# Patient Record
Sex: FEMALE | Race: WHITE | NOT HISPANIC OR LATINO | URBAN - METROPOLITAN AREA
[De-identification: names, ages, dates, MRNs, and addresses within clinical notes are randomized per-mention and may not be internally consistent; named-entity substitution may affect disease eponyms.]

---

## 2020-03-01 ENCOUNTER — OUTPATIENT (OUTPATIENT)
Dept: OUTPATIENT SERVICES | Facility: HOSPITAL | Age: 60
LOS: 1 days | End: 2020-03-01
Payer: MEDICAID

## 2020-03-01 PROCEDURE — G9001: CPT

## 2020-03-05 ENCOUNTER — INPATIENT (INPATIENT)
Facility: HOSPITAL | Age: 60
LOS: 0 days | Discharge: HOME | End: 2020-03-06
Attending: INTERNAL MEDICINE | Admitting: INTERNAL MEDICINE
Payer: MEDICAID

## 2020-03-05 VITALS
RESPIRATION RATE: 18 BRPM | SYSTOLIC BLOOD PRESSURE: 108 MMHG | DIASTOLIC BLOOD PRESSURE: 54 MMHG | OXYGEN SATURATION: 98 % | HEART RATE: 74 BPM | TEMPERATURE: 98 F | HEIGHT: 60 IN | WEIGHT: 139.99 LBS

## 2020-03-05 DIAGNOSIS — Z94.4 LIVER TRANSPLANT STATUS: Chronic | ICD-10-CM

## 2020-03-05 DIAGNOSIS — Z98.890 OTHER SPECIFIED POSTPROCEDURAL STATES: Chronic | ICD-10-CM

## 2020-03-05 LAB
ALBUMIN SERPL ELPH-MCNC: 3.9 G/DL — SIGNIFICANT CHANGE UP (ref 3.5–5.2)
ALP SERPL-CCNC: 296 U/L — HIGH (ref 30–115)
ALT FLD-CCNC: 34 U/L — SIGNIFICANT CHANGE UP (ref 0–41)
ANION GAP SERPL CALC-SCNC: 10 MMOL/L — SIGNIFICANT CHANGE UP (ref 7–14)
APTT BLD: 32.7 SEC — SIGNIFICANT CHANGE UP (ref 27–39.2)
AST SERPL-CCNC: 56 U/L — HIGH (ref 0–41)
BASOPHILS # BLD AUTO: 0.03 K/UL — SIGNIFICANT CHANGE UP (ref 0–0.2)
BASOPHILS NFR BLD AUTO: 0.7 % — SIGNIFICANT CHANGE UP (ref 0–1)
BILIRUB SERPL-MCNC: 0.4 MG/DL — SIGNIFICANT CHANGE UP (ref 0.2–1.2)
BUN SERPL-MCNC: 20 MG/DL — SIGNIFICANT CHANGE UP (ref 10–20)
CALCIUM SERPL-MCNC: 9 MG/DL — SIGNIFICANT CHANGE UP (ref 8.5–10.1)
CHLORIDE SERPL-SCNC: 105 MMOL/L — SIGNIFICANT CHANGE UP (ref 98–110)
CO2 SERPL-SCNC: 24 MMOL/L — SIGNIFICANT CHANGE UP (ref 17–32)
CREAT SERPL-MCNC: 1.2 MG/DL — SIGNIFICANT CHANGE UP (ref 0.7–1.5)
EOSINOPHIL # BLD AUTO: 0.2 K/UL — SIGNIFICANT CHANGE UP (ref 0–0.7)
EOSINOPHIL NFR BLD AUTO: 4.3 % — SIGNIFICANT CHANGE UP (ref 0–8)
GLUCOSE SERPL-MCNC: 100 MG/DL — HIGH (ref 70–99)
HCT VFR BLD CALC: 35.4 % — LOW (ref 37–47)
HGB BLD-MCNC: 11.1 G/DL — LOW (ref 12–16)
IMM GRANULOCYTES NFR BLD AUTO: 0.4 % — HIGH (ref 0.1–0.3)
INR BLD: 1.1 RATIO — SIGNIFICANT CHANGE UP (ref 0.65–1.3)
LYMPHOCYTES # BLD AUTO: 0.77 K/UL — LOW (ref 1.2–3.4)
LYMPHOCYTES # BLD AUTO: 16.7 % — LOW (ref 20.5–51.1)
MAGNESIUM SERPL-MCNC: 1.3 MG/DL — LOW (ref 1.8–2.4)
MCHC RBC-ENTMCNC: 28.8 PG — SIGNIFICANT CHANGE UP (ref 27–31)
MCHC RBC-ENTMCNC: 31.4 G/DL — LOW (ref 32–37)
MCV RBC AUTO: 91.9 FL — SIGNIFICANT CHANGE UP (ref 81–99)
MONOCYTES # BLD AUTO: 0.53 K/UL — SIGNIFICANT CHANGE UP (ref 0.1–0.6)
MONOCYTES NFR BLD AUTO: 11.5 % — HIGH (ref 1.7–9.3)
NEUTROPHILS # BLD AUTO: 3.05 K/UL — SIGNIFICANT CHANGE UP (ref 1.4–6.5)
NEUTROPHILS NFR BLD AUTO: 66.4 % — SIGNIFICANT CHANGE UP (ref 42.2–75.2)
NRBC # BLD: 0 /100 WBCS — SIGNIFICANT CHANGE UP (ref 0–0)
PLATELET # BLD AUTO: 109 K/UL — LOW (ref 130–400)
POTASSIUM SERPL-MCNC: 4.3 MMOL/L — SIGNIFICANT CHANGE UP (ref 3.5–5)
POTASSIUM SERPL-SCNC: 4.3 MMOL/L — SIGNIFICANT CHANGE UP (ref 3.5–5)
PROT SERPL-MCNC: 8.1 G/DL — HIGH (ref 6–8)
PROTHROM AB SERPL-ACNC: 12.6 SEC — SIGNIFICANT CHANGE UP (ref 9.95–12.87)
RBC # BLD: 3.85 M/UL — LOW (ref 4.2–5.4)
RBC # FLD: 14.9 % — HIGH (ref 11.5–14.5)
SODIUM SERPL-SCNC: 139 MMOL/L — SIGNIFICANT CHANGE UP (ref 135–146)
TROPONIN T SERPL-MCNC: <0.01 NG/ML — SIGNIFICANT CHANGE UP
WBC # BLD: 4.6 K/UL — LOW (ref 4.8–10.8)
WBC # FLD AUTO: 4.6 K/UL — LOW (ref 4.8–10.8)

## 2020-03-05 PROCEDURE — 99285 EMERGENCY DEPT VISIT HI MDM: CPT

## 2020-03-05 PROCEDURE — 70450 CT HEAD/BRAIN W/O DYE: CPT | Mod: 26

## 2020-03-05 PROCEDURE — 93880 EXTRACRANIAL BILAT STUDY: CPT | Mod: 26

## 2020-03-05 PROCEDURE — 70551 MRI BRAIN STEM W/O DYE: CPT | Mod: 26

## 2020-03-05 PROCEDURE — 99223 1ST HOSP IP/OBS HIGH 75: CPT

## 2020-03-05 PROCEDURE — 70544 MR ANGIOGRAPHY HEAD W/O DYE: CPT | Mod: 26,59

## 2020-03-05 PROCEDURE — 70548 MR ANGIOGRAPHY NECK W/DYE: CPT | Mod: 26

## 2020-03-05 PROCEDURE — 71046 X-RAY EXAM CHEST 2 VIEWS: CPT | Mod: 26

## 2020-03-05 RX ORDER — ENOXAPARIN SODIUM 100 MG/ML
40 INJECTION SUBCUTANEOUS DAILY
Refills: 0 | Status: DISCONTINUED | OUTPATIENT
Start: 2020-03-05 | End: 2020-03-06

## 2020-03-05 RX ORDER — MAGNESIUM SULFATE 500 MG/ML
2 VIAL (ML) INJECTION ONCE
Refills: 0 | Status: COMPLETED | OUTPATIENT
Start: 2020-03-05 | End: 2020-03-05

## 2020-03-05 RX ORDER — ASPIRIN/CALCIUM CARB/MAGNESIUM 324 MG
325 TABLET ORAL ONCE
Refills: 0 | Status: COMPLETED | OUTPATIENT
Start: 2020-03-05 | End: 2020-03-05

## 2020-03-05 RX ORDER — ATORVASTATIN CALCIUM 80 MG/1
80 TABLET, FILM COATED ORAL AT BEDTIME
Refills: 0 | Status: DISCONTINUED | OUTPATIENT
Start: 2020-03-05 | End: 2020-03-06

## 2020-03-05 RX ORDER — PANTOPRAZOLE SODIUM 20 MG/1
40 TABLET, DELAYED RELEASE ORAL
Refills: 0 | Status: DISCONTINUED | OUTPATIENT
Start: 2020-03-05 | End: 2020-03-06

## 2020-03-05 RX ORDER — TACROLIMUS 5 MG/1
2 CAPSULE ORAL
Refills: 0 | Status: DISCONTINUED | OUTPATIENT
Start: 2020-03-05 | End: 2020-03-06

## 2020-03-05 RX ORDER — SENNA PLUS 8.6 MG/1
2 TABLET ORAL AT BEDTIME
Refills: 0 | Status: DISCONTINUED | OUTPATIENT
Start: 2020-03-05 | End: 2020-03-06

## 2020-03-05 RX ORDER — CHLORHEXIDINE GLUCONATE 213 G/1000ML
1 SOLUTION TOPICAL
Refills: 0 | Status: DISCONTINUED | OUTPATIENT
Start: 2020-03-05 | End: 2020-03-06

## 2020-03-05 RX ADMIN — Medication 50 GRAM(S): at 21:06

## 2020-03-05 RX ADMIN — Medication 1 APPLICATION(S): at 22:24

## 2020-03-05 RX ADMIN — Medication 325 MILLIGRAM(S): at 12:14

## 2020-03-05 RX ADMIN — Medication 50 GRAM(S): at 11:13

## 2020-03-05 RX ADMIN — SENNA PLUS 2 TABLET(S): 8.6 TABLET ORAL at 21:05

## 2020-03-05 RX ADMIN — Medication 2 GRAM(S): at 12:15

## 2020-03-05 RX ADMIN — ATORVASTATIN CALCIUM 80 MILLIGRAM(S): 80 TABLET, FILM COATED ORAL at 21:07

## 2020-03-05 RX ADMIN — TACROLIMUS 2 MILLIGRAM(S): 5 CAPSULE ORAL at 21:07

## 2020-03-05 NOTE — H&P ADULT - NSHPPHYSICALEXAM_GEN_ALL_CORE
PHYSICAL EXAM:  GENERAL: NAD, well-developed  HEAD:  Atraumatic, Normocephalic  EYES: EOMI, PERRLA, conjunctiva and sclera clear  NECK: Supple, No JVD  CHEST/LUNG: Clear to auscultation bilaterally; No wheeze  HEART: Regular rate and rhythm; No murmurs, rubs, or gallops  ABDOMEN: Soft, Nontender, Nondistended; Bowel sounds present  EXTREMITIES:  2+ Peripheral Pulses, No clubbing, cyanosis, or edema  PSYCH: AAOx3  NEUROLOGY: non-focal, mild slurred speech noted, no weakness or sensory deficits of all extremities. No facial droop noted.  SKIN: No rashes or lesions

## 2020-03-05 NOTE — ED ADULT NURSE NOTE - PMH
Acid reflux    CVA (cerebral vascular accident)    Hemiparesis due to old stroke    Liver transplanted

## 2020-03-05 NOTE — ED PROVIDER NOTE - ATTENDING CONTRIBUTION TO CARE
I personally evaluated patient. I agree with the findings and plan with all documentation on chart except as documented  in my note.    60 y/o F PMH CVA 2018 w no residual deficits, Liver transplant (2006) who woke up this morning 7:30AM with slurred speech that lasted approximately one hour. Symptoms resolved prior to EMS arrival, but patient endorses some return of symptoms. Last known well time was last night. Patient is outside of the t-PA window,and is not an interventional candidate given NIH of 0. No HA, no difficulty walking. Endorses having a liver Bx done yesterday. no fever, no abdominal pain. Denies weakness. NIH score upon arrival is 0.    On exam, VS reviewed. Stroke Code called upon arrival. Patient had immediate head CT and appropriate labs. ED work up reviewed and head CT unremarkable.  Will admit for TIA work up for MRI and neck vessel imaging. Patient and family spoken to in detail about results and plan of care.  All questions addressed.  They are aware patient requires admission for further treatment and work up.

## 2020-03-05 NOTE — ED ADULT TRIAGE NOTE - CHIEF COMPLAINT QUOTE
Per EMS, patient woke up at 7:30 am with slurred speech and weakness on right side. Pt states by 8:45am symptoms were resolved. Pt had a stroke in 2018

## 2020-03-05 NOTE — ED PROVIDER NOTE - OBJECTIVE STATEMENT
Patient BIBA for possible TIA, H/o CVA 2018, Woke this am 7:30, noted speech was slurred last approx 1 hour. Sx resolved prior to EMS arrival and patient in now back to normal. No HA, no difficulty walking. H/o liver transplant. Had Bx done yesterday. no fever, no adnominal pain.

## 2020-03-05 NOTE — ED PROVIDER NOTE - CARE PLAN
Principal Discharge DX:	TIA (transient ischemic attack)  Secondary Diagnosis:	H/O liver transplant  Secondary Diagnosis:	History of liver biopsy

## 2020-03-05 NOTE — ED PROVIDER NOTE - PROGRESS NOTE DETAILS
Patient not stroke code, patient woke with Sx, Onset unknown. Sx resloved, NIH stroke scale 0 at this time case discussed with Dr Burk (Neuro). Wants patient admitted for q h neuro checks, Dr Escobar accepts in CCU tele

## 2020-03-05 NOTE — ED ADULT NURSE NOTE - NSIMPLEMENTINTERV_GEN_ALL_ED
Implemented All Fall Risk Interventions:  [place] to call system. Call bell, personal items and telephone within reach. Instruct patient to call for assistance. Room bathroom lighting operational. Non-slip footwear when patient is off stretcher. Physically safe environment: no spills, clutter or unnecessary equipment. Stretcher in lowest position, wheels locked, appropriate side rails in place. Provide visual cue, wrist band, yellow gown, etc. Monitor gait and stability. Monitor for mental status changes and reorient to person, place, and time. Review medications for side effects contributing to fall risk. Reinforce activity limits and safety measures with patient and family.

## 2020-03-05 NOTE — H&P ADULT - ASSESSMENT
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 Admitted to r/o CVA.    1. Slurred speech, possibly secondary to TIA/CVA, ?magnesium deficiency: Will obtain MRI/MRA head neck. Monitor in CCU for q1h neurochecks. Speech/Swallow therapist to see patient. Neurology eval to follow up. Cont ASA/Statin.   2. h/o Liver transplant due to PBC: Cont tacrolimus 2mg BID. Biopsy was done yesterday at [facility] due to transaminitis. 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 Admitted to r/o CVA.    1. Slurred speech, possibly secondary to TIA/CVA, ?magnesium deficiency: Will obtain MRI/MRA head neck, echo, carotid doppler. Monitor in CCU for q4h neurochecks. Speech/Swallow therapist to see patient. Neurology eval to follow up. Cont ASA/Statin.   2. h/o Liver transplant due to PBC: Cont tacrolimus 2mg BID. Biopsy was done yesterday at [facility] due to transaminitis. Outpatient f/u    GI/DVT PPX

## 2020-03-05 NOTE — ED PROVIDER NOTE - CLINICAL SUMMARY MEDICAL DECISION MAKING FREE TEXT BOX
60 y/o F PMH CVA 2018 w no residual deficits, Liver transplant (2006) who woke up this morning 7:30AM with slurred speech that lasted approximately one hour. Symptoms resolved prior to EMS arrival, but patient endorses some return of symptoms. Last known well time was last night. Patient is outside of the t-PA window,and is not an interventional candidate given NIH of 0. No HA, no difficulty walking. Endorses having a liver Bx done yesterday. no fever, no abdominal pain. Denies weakness. NIH score upon arrival is 0.    On exam, VS reviewed. Stroke Code called upon arrival. Patient had immediate head CT and appropriate labs. ED work up reviewed and head CT unremarkable.  Will admit for TIA work up for MRI and neck vessel imaging. Patient and family spoken to in detail about results and plan of care.  All questions addressed.  They are aware patient requires admission for further treatment and work up.

## 2020-03-05 NOTE — H&P ADULT - HISTORY OF PRESENT ILLNESS
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 Symptoms resolved prior to EMS arrival, but patient endorses some return of symptoms.. No HA, no difficulty walking. Endorses having a liver Bx done yesterday. no fever, no abdominal pain. Denies weakness

## 2020-03-06 ENCOUNTER — TRANSCRIPTION ENCOUNTER (OUTPATIENT)
Age: 60
End: 2020-03-06

## 2020-03-06 VITALS — DIASTOLIC BLOOD PRESSURE: 66 MMHG | SYSTOLIC BLOOD PRESSURE: 135 MMHG

## 2020-03-06 PROBLEM — Z00.00 ENCOUNTER FOR PREVENTIVE HEALTH EXAMINATION: Status: ACTIVE | Noted: 2020-03-06

## 2020-03-06 LAB
ANION GAP SERPL CALC-SCNC: 7 MMOL/L — SIGNIFICANT CHANGE UP (ref 7–14)
BUN SERPL-MCNC: 15 MG/DL — SIGNIFICANT CHANGE UP (ref 10–20)
CALCIUM SERPL-MCNC: 8.2 MG/DL — LOW (ref 8.5–10.1)
CHLORIDE SERPL-SCNC: 106 MMOL/L — SIGNIFICANT CHANGE UP (ref 98–110)
CHOLEST SERPL-MCNC: 138 MG/DL — SIGNIFICANT CHANGE UP (ref 100–200)
CO2 SERPL-SCNC: 26 MMOL/L — SIGNIFICANT CHANGE UP (ref 17–32)
CREAT SERPL-MCNC: 1.2 MG/DL — SIGNIFICANT CHANGE UP (ref 0.7–1.5)
GLUCOSE SERPL-MCNC: 104 MG/DL — HIGH (ref 70–99)
HCT VFR BLD CALC: 32 % — LOW (ref 37–47)
HCV AB S/CO SERPL IA: 0.47 S/CO — SIGNIFICANT CHANGE UP (ref 0–0.99)
HCV AB SERPL-IMP: SIGNIFICANT CHANGE UP
HDLC SERPL-MCNC: 73 MG/DL — SIGNIFICANT CHANGE UP
HGB BLD-MCNC: 10 G/DL — LOW (ref 12–16)
LIPID PNL WITH DIRECT LDL SERPL: 55 MG/DL — SIGNIFICANT CHANGE UP (ref 4–129)
MAGNESIUM SERPL-MCNC: 1.9 MG/DL — SIGNIFICANT CHANGE UP (ref 1.8–2.4)
MCHC RBC-ENTMCNC: 29 PG — SIGNIFICANT CHANGE UP (ref 27–31)
MCHC RBC-ENTMCNC: 31.3 G/DL — LOW (ref 32–37)
MCV RBC AUTO: 92.8 FL — SIGNIFICANT CHANGE UP (ref 81–99)
NRBC # BLD: 0 /100 WBCS — SIGNIFICANT CHANGE UP (ref 0–0)
PLATELET # BLD AUTO: 89 K/UL — LOW (ref 130–400)
POTASSIUM SERPL-MCNC: 4.7 MMOL/L — SIGNIFICANT CHANGE UP (ref 3.5–5)
POTASSIUM SERPL-SCNC: 4.7 MMOL/L — SIGNIFICANT CHANGE UP (ref 3.5–5)
RBC # BLD: 3.45 M/UL — LOW (ref 4.2–5.4)
RBC # FLD: 15 % — HIGH (ref 11.5–14.5)
SODIUM SERPL-SCNC: 139 MMOL/L — SIGNIFICANT CHANGE UP (ref 135–146)
TOTAL CHOLESTEROL/HDL RATIO MEASUREMENT: 1.9 RATIO — LOW (ref 4–5.5)
TRIGL SERPL-MCNC: 57 MG/DL — SIGNIFICANT CHANGE UP (ref 10–149)
WBC # BLD: 3.57 K/UL — LOW (ref 4.8–10.8)
WBC # FLD AUTO: 3.57 K/UL — LOW (ref 4.8–10.8)

## 2020-03-06 PROCEDURE — 99222 1ST HOSP IP/OBS MODERATE 55: CPT

## 2020-03-06 PROCEDURE — 99239 HOSP IP/OBS DSCHRG MGMT >30: CPT

## 2020-03-06 RX ORDER — ASPIRIN/CALCIUM CARB/MAGNESIUM 324 MG
1 TABLET ORAL
Qty: 30 | Refills: 0
Start: 2020-03-06

## 2020-03-06 RX ORDER — MAGNESIUM OXIDE 400 MG ORAL TABLET 241.3 MG
1 TABLET ORAL
Qty: 30 | Refills: 0
Start: 2020-03-06

## 2020-03-06 RX ORDER — MAGNESIUM OXIDE 400 MG ORAL TABLET 241.3 MG
400 TABLET ORAL DAILY
Refills: 0 | Status: DISCONTINUED | OUTPATIENT
Start: 2020-03-06 | End: 2020-03-06

## 2020-03-06 RX ORDER — ASPIRIN/CALCIUM CARB/MAGNESIUM 324 MG
81 TABLET ORAL DAILY
Refills: 0 | Status: DISCONTINUED | OUTPATIENT
Start: 2020-03-06 | End: 2020-03-06

## 2020-03-06 RX ADMIN — ENOXAPARIN SODIUM 40 MILLIGRAM(S): 100 INJECTION SUBCUTANEOUS at 11:00

## 2020-03-06 RX ADMIN — TACROLIMUS 2 MILLIGRAM(S): 5 CAPSULE ORAL at 08:56

## 2020-03-06 RX ADMIN — Medication 81 MILLIGRAM(S): at 11:01

## 2020-03-06 RX ADMIN — PANTOPRAZOLE SODIUM 40 MILLIGRAM(S): 20 TABLET, DELAYED RELEASE ORAL at 05:42

## 2020-03-06 RX ADMIN — CHLORHEXIDINE GLUCONATE 1 APPLICATION(S): 213 SOLUTION TOPICAL at 05:42

## 2020-03-06 RX ADMIN — MAGNESIUM OXIDE 400 MG ORAL TABLET 400 MILLIGRAM(S): 241.3 TABLET ORAL at 11:11

## 2020-03-06 NOTE — DISCHARGE NOTE PROVIDER - HOSPITAL COURSE
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 Symptoms resolved prior to EMS arrival, but patient endorses some return of symptoms.. No HA, no difficulty walking. Endorses having a liver Bx done yesterday. no fever, no abdominal pain. Denies weakness     She had imaging for CVA including MRI and MRA. The imaging was normal. Patient was seen by neurology who recommended aspirin but no statin given her history of transplant and rising LFTs.        ECHO preliminary report shows mild to moderate AI.

## 2020-03-06 NOTE — DISCHARGE NOTE PROVIDER - NSDCMRMEDTOKEN_GEN_ALL_CORE_FT
aspirin 81 mg oral tablet, chewable: 1 tab(s) orally once a day  magnesium oxide 400 mg (241.3 mg elemental magnesium) oral tablet: 1 tab(s) orally once a day  pantoprazole:   tacrolimus:

## 2020-03-06 NOTE — CONSULT NOTE ADULT - SUBJECTIVE AND OBJECTIVE BOX
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 Symptoms resolved prior to EMS arrival, but patient endorses some return of symptoms.. No HA, no difficulty walking. Endorses having a liver Bx done yesterday. no fever, no abdominal pain. Denies weakness (05 Mar 2020 12:50)      NEURO  PATIENT IS A 58 YO FEMALE WITH HX OF TIA/CVA, LIVER TRANSPLANT 2/2 PBC AND RECENT BX ADM WITH 1 HR OF DYSARTHRIA. PATIENT SAID THAT THIS OCCURRED IN THE PAST 2/2 HYPOMAGNESEMIA. HER Mg level 1.9   she is back at her baseline  she had unremarkable expedited  mri brain and mra h/n  no other focal neuro complaints       Vital Signs Last 24 Hrs  T(C): 37.1 (06 Mar 2020 07:02), Max: 37.1 (06 Mar 2020 07:02)  T(F): 98.7 (06 Mar 2020 07:02), Max: 98.7 (06 Mar 2020 07:02)  HR: 70 (06 Mar 2020 07:02) (70 - 83)  BP: 121/66 (06 Mar 2020 07:02) (119/64 - 152/67)  BP(mean): 88 (06 Mar 2020 07:02) (86 - 88)  RR: 18 (06 Mar 2020 07:02) (16 - 18)  SpO2: 98% (05 Mar 2020 11:23) (98% - 98%)    Neurological Exam:   Mental status: Awake, alert and oriented x3.  Recent and remote memory intact.  Naming, repetition and comprehension intact.  Attention/concentration intact.  No dysarthria, no aphasia.  Fund of knowledge appropriate.    Cranial nerves: Pupils equally round and reactive to light, visual fields full, no nystagmus, extraocular muscles intact, V1 through V3 intact bilaterally and symmetric, face symmetric, hearing intact to finger rub, palate elevation symmetric, tongue was midline.  Motor:  MRC grading 5/5 b/l UE/LE.   strength 5/5.  Normal tone and bulk.  No abnormal movements.    Sensation: Intact to light touch, proprioception, and pinprick.   Coordination: No dysmetria on finger-to-nose and heel-to-shin.  No dysdiadokinesia.  Reflexes: 2+ in bilateral UE/LE, downgoing toes bilaterally. (-) Dhiraj.      Medications  aspirin  chewable 81 milliGRAM(s) Oral daily  atorvastatin 80 milliGRAM(s) Oral at bedtime  bisacodyl 5 milliGRAM(s) Oral daily PRN  chlorhexidine 4% Liquid 1 Application(s) Topical <User Schedule>  enoxaparin Injectable 40 milliGRAM(s) SubCutaneous daily  magnesium oxide 400 milliGRAM(s) Oral daily  pantoprazole    Tablet 40 milliGRAM(s) Oral before breakfast  senna 2 Tablet(s) Oral at bedtime  tacrolimus 2 milliGRAM(s) Oral two times a day  triamcinolone 0.1% Oral Paste 1 Application(s) Topical two times a day PRN      Lab  03-06    139  |  106  |  15  ----------------------------<  104<H>  4.7   |  26  |  1.2    Ca    8.2<L>      06 Mar 2020 06:01  Mg     1.9     03-06    TPro  8.1<H>  /  Alb  3.9  /  TBili  0.4  /  DBili  x   /  AST  56<H>  /  ALT  34  /  AlkPhos  296<H>  03-05                          10.0   3.57  )-----------( 89       ( 06 Mar 2020 06:01 )             32.0     LIVER FUNCTIONS - ( 05 Mar 2020 09:36 )  Alb: 3.9 g/dL / Pro: 8.1 g/dL / ALK PHOS: 296 U/L / ALT: 34 U/L / AST: 56 U/L / GGT: x             1.9        Radiology      Assessment:  60 yo female with hx as above adm with transient dysarthria  likely TIA  Plan:  START ASA 81 MG   HOLD ON STATIN GIVEN HEPATIC COMORBIDITY AND RISING LFTS  F/U NEURO IN 2 WEEKS  PLEASE CALL WITH ANY QUESTIONS

## 2020-03-06 NOTE — PROGRESS NOTE ADULT - ASSESSMENT
Impression:  TIA  HO Liver transplant.    PLAN:    CNS: ASA/ Lipitor. Neuro follow up.     HEENT: Oral care    PULMONARY:  HOB @ 45 degrees    CARDIOVASCULAR: i=o    GI: GI prophylaxis.  Feeding     RENAL:  Follow up lytes.  Correct as needed    INFECTIOUS DISEASE: Follow up cultures    HEMATOLOGICAL:  DVT prophylaxis.    ENDOCRINE:  Follow up FS.  Insulin protocol if needed.    MUSCULOSKELETAL: out of bed to chair  discharge home.

## 2020-03-06 NOTE — DISCHARGE NOTE PROVIDER - NSDCCPCAREPLAN_GEN_ALL_CORE_FT
PRINCIPAL DISCHARGE DIAGNOSIS  Diagnosis: TIA (transient ischemic attack)  Assessment and Plan of Treatment: - Continue Aspirin  - FOllow up with neurologist  - Please return to ED if you have the same symptoms again      SECONDARY DISCHARGE DIAGNOSES  Diagnosis: Hypomagnesemia  Assessment and Plan of Treatment: - Take magnesium tablets as prescribed

## 2020-03-06 NOTE — DISCHARGE NOTE PROVIDER - CARE PROVIDERS DIRECT ADDRESSES
[email or web address]

## 2020-03-06 NOTE — PROGRESS NOTE ADULT - SUBJECTIVE AND OBJECTIVE BOX
Patient reports all her symptoms have resolved      T(F): 98.7 (03-06-20 @ 07:02), Max: 98.7 (03-06-20 @ 07:02)  HR: 70 (03-06-20 @ 07:02)  BP: 121/66 (03-06-20 @ 07:02)  RR: 18 (03-06-20 @ 07:02)  SpO2: 98% (03-05-20 @ 11:23) (98% - 98%)    PHYSICAL EXAM:  GENERAL: NAD  HEAD:  Atraumatic, Normocephalic  EYES: EOMI, PERRLA, conjunctiva and sclera clear  NERVOUS SYSTEM:  Alert & Oriented X3, no focal deficits   CHEST/LUNG: Clear to percussion bilaterally; No rales, rhonchi, wheezing, or rubs  HEART: Regular rate and rhythm; No murmurs, rubs, or gallops  ABDOMEN: Soft, Nontender, Nondistended; Bowel sounds present  EXTREMITIES:  2+ Peripheral Pulses, No clubbing, cyanosis, or edema  LYMPH: No lymphadenopathy noted  SKIN: No rashes or lesions    LABS  03-06    139  |  106  |  15  ----------------------------<  104<H>  4.7   |  26  |  1.2    Ca    8.2<L>      06 Mar 2020 06:01  Mg     1.9     03-06    TPro  8.1<H>  /  Alb  3.9  /  TBili  0.4  /  DBili  x   /  AST  56<H>  /  ALT  34  /  AlkPhos  296<H>  03-05                          10.0   3.57  )-----------( 89       ( 06 Mar 2020 06:01 )             32.0     PT/INR - ( 05 Mar 2020 09:36 )   PT: 12.60 sec;   INR: 1.10 ratio         PTT - ( 05 Mar 2020 09:36 )  PTT:32.7 sec    CARDIAC ENZYMES      Troponin T, Serum: <0.01 ng/mL (03-05-20 @ 09:36)    < from: 12 Lead ECG (03.05.20 @ 09:38) >  Diagnosis Line Normal sinus rhythm  Normal ECG      2DECHO - prelim normal LV function    RADIOLOGY  < from: MR Head No Cont (03.05.20 @ 17:54) >  IMPRESSION:  MRI brain: No acute infarct, acute intracranial hemorrhage, or mass effect.    MRA brain: No stenosis or aneurysm.    MRA neck: No stenosis. No dissection.     < end of copied text >    MEDICATIONS  (STANDING):  atorvastatin 80 milliGRAM(s) Oral at bedtime  chlorhexidine 4% Liquid 1 Application(s) Topical <User Schedule>  enoxaparin Injectable 40 milliGRAM(s) SubCutaneous daily  pantoprazole    Tablet 40 milliGRAM(s) Oral before breakfast  senna 2 Tablet(s) Oral at bedtime  tacrolimus 2 milliGRAM(s) Oral two times a day    MEDICATIONS  (PRN):  bisacodyl 5 milliGRAM(s) Oral daily PRN Constipation  triamcinolone 0.1% Oral Paste 1 Application(s) Topical two times a day PRN mouth sores

## 2020-03-06 NOTE — PROGRESS NOTE ADULT - ATTENDING COMMENTS
Attending Statement: I have personally performed a face to face diagnostic evaluation on this patient. The patient is suffering from TIA.   I have reviewed the above note and agree with the history, exam and suggestions for care, except as I have noted in the text.

## 2020-03-06 NOTE — PROGRESS NOTE ADULT - ASSESSMENT
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 Admitted to r/o CVA.    1. Slurred speech -> resolved possibly secondary to magnesium deficiency, MRI/MRA head neck and  echo -> negative     - may dc home today on aspirin 81mg daily and daily oral magnesium supplement   with neuro f/u  in 2 weeks, no statins now since pt is in the middle of w/u for elevated LFT's, 33min spent on DC    2. h/o Liver transplant due to PBC: Cont tacrolimus 2mg BID. Biopsy was done at [place] due to transaminitis. Outpatient f/u

## 2020-03-06 NOTE — DISCHARGE NOTE PROVIDER - CARE PROVIDER_API CALL
Lachelle Dhaliwal)  Neurology  [address] [place] [address], [address]  [place] [address]  Phone: [phone]  Fax: [phone]  Follow Up Time: 1 month

## 2020-03-06 NOTE — DISCHARGE NOTE NURSING/CASE MANAGEMENT/SOCIAL WORK - PATIENT PORTAL LINK FT
You can access the FollowMyHealth Patient Portal offered by [facility] by registering at the following website: [email or web address]. By joining [facility]’s FollowMyHealth portal, you will also be able to view your health information using other applications (apps) compatible with our system.

## 2020-03-09 PROBLEM — I63.9 CEREBRAL INFARCTION, UNSPECIFIED: Chronic | Status: ACTIVE | Noted: 2020-03-05

## 2020-03-09 PROBLEM — K21.9 GASTRO-ESOPHAGEAL REFLUX DISEASE WITHOUT ESOPHAGITIS: Chronic | Status: ACTIVE | Noted: 2020-03-05

## 2020-03-09 PROBLEM — I69.359 HEMIPLEGIA AND HEMIPARESIS FOLLOWING CEREBRAL INFARCTION AFFECTING UNSPECIFIED SIDE: Chronic | Status: ACTIVE | Noted: 2020-03-05

## 2020-03-09 PROBLEM — Z94.4 LIVER TRANSPLANT STATUS: Chronic | Status: ACTIVE | Noted: 2020-03-05

## 2020-03-11 DIAGNOSIS — Z86.73 PERSONAL HISTORY OF TRANSIENT ISCHEMIC ATTACK (TIA), AND CEREBRAL INFARCTION WITHOUT RESIDUAL DEFICITS: ICD-10-CM

## 2020-03-11 DIAGNOSIS — G45.9 TRANSIENT CEREBRAL ISCHEMIC ATTACK, UNSPECIFIED: ICD-10-CM

## 2020-03-11 DIAGNOSIS — Z94.4 LIVER TRANSPLANT STATUS: ICD-10-CM

## 2020-03-11 DIAGNOSIS — E83.42 HYPOMAGNESEMIA: ICD-10-CM

## 2020-03-11 DIAGNOSIS — Z71.89 OTHER SPECIFIED COUNSELING: ICD-10-CM

## 2020-03-11 DIAGNOSIS — R47.1 DYSARTHRIA AND ANARTHRIA: ICD-10-CM

## 2020-03-11 DIAGNOSIS — Z87.891 PERSONAL HISTORY OF NICOTINE DEPENDENCE: ICD-10-CM

## 2020-03-27 ENCOUNTER — APPOINTMENT (OUTPATIENT)
Dept: NEUROLOGY | Facility: CLINIC | Age: 60
End: 2020-03-27

## 2020-03-28 ENCOUNTER — NON-APPOINTMENT (OUTPATIENT)
Age: 60
End: 2020-03-28

## 2020-03-28 DIAGNOSIS — G45.9 TRANSIENT CEREBRAL ISCHEMIC ATTACK, UNSPECIFIED: ICD-10-CM

## 2020-06-12 NOTE — ED PROVIDER NOTE - NS HIV RISK FACTOR YES
Lab Result Notifications    · If labs were ordered at your appointment today, we will contact you with results once all your labs have resulted. Please note certain hormone labs can take up to 10 business days to result.     Prescription Policy     Our goal is to serve you on a more timely basis. Currently, our office receives a large volume of phone requests for medication refills. We are requesting your cooperation with the following:    · Look over your medications, diabetic supplies, etc. before coming to your appointment to see if you need any refills.    · Request your medication (or supply) refills during your office visit.    · Outside of an office visit, requests should be directed to your pharmacy even if you have zero refills. Call your pharmacy after 72 hours to see if your prescription is ready for pick-up.     Pharmacy Refills: All prescriptions take 48-72 hours to refill.          Our Patients are Important    Our goal is for your appointment to start at your appointment time.     Please arrive 15 minutes early to allow our staff adequate time to prepare you for your visit to meet with your provider at the scheduled appointment time.     Additionally, if you need to cancel or change your appointment our clinic requests 24 - 48 hours notice, to allow us to refill that open appointment.     We want to improve and you can help. You may receive a survey asking you about this visit. Please complete this survey; we will use your feedback to make improvements. Thank you.            Declined

## 2020-07-01 ENCOUNTER — INPATIENT (INPATIENT)
Facility: HOSPITAL | Age: 60
LOS: 2 days | Discharge: HOME | End: 2020-07-04
Attending: HOSPITALIST | Admitting: HOSPITALIST
Payer: MEDICAID

## 2020-07-01 VITALS
HEART RATE: 80 BPM | RESPIRATION RATE: 18 BRPM | OXYGEN SATURATION: 100 % | SYSTOLIC BLOOD PRESSURE: 162 MMHG | DIASTOLIC BLOOD PRESSURE: 74 MMHG

## 2020-07-01 DIAGNOSIS — Z94.4 LIVER TRANSPLANT STATUS: Chronic | ICD-10-CM

## 2020-07-01 DIAGNOSIS — Z98.890 OTHER SPECIFIED POSTPROCEDURAL STATES: Chronic | ICD-10-CM

## 2020-07-01 LAB
ALBUMIN SERPL ELPH-MCNC: 4.3 G/DL — SIGNIFICANT CHANGE UP (ref 3.5–5.2)
ALP SERPL-CCNC: 134 U/L — HIGH (ref 30–115)
ALT FLD-CCNC: 23 U/L — SIGNIFICANT CHANGE UP (ref 0–41)
ANION GAP SERPL CALC-SCNC: 12 MMOL/L — SIGNIFICANT CHANGE UP (ref 7–14)
APPEARANCE UR: CLEAR — SIGNIFICANT CHANGE UP
AST SERPL-CCNC: 51 U/L — HIGH (ref 0–41)
BASOPHILS # BLD AUTO: 0.02 K/UL — SIGNIFICANT CHANGE UP (ref 0–0.2)
BASOPHILS NFR BLD AUTO: 0.4 % — SIGNIFICANT CHANGE UP (ref 0–1)
BILIRUB SERPL-MCNC: 0.6 MG/DL — SIGNIFICANT CHANGE UP (ref 0.2–1.2)
BILIRUB UR-MCNC: NEGATIVE — SIGNIFICANT CHANGE UP
BUN SERPL-MCNC: 20 MG/DL — SIGNIFICANT CHANGE UP (ref 10–20)
CALCIUM SERPL-MCNC: 8.8 MG/DL — SIGNIFICANT CHANGE UP (ref 8.5–10.1)
CHLORIDE SERPL-SCNC: 98 MMOL/L — SIGNIFICANT CHANGE UP (ref 98–110)
CO2 SERPL-SCNC: 21 MMOL/L — SIGNIFICANT CHANGE UP (ref 17–32)
COLOR SPEC: YELLOW — SIGNIFICANT CHANGE UP
CREAT SERPL-MCNC: 1.4 MG/DL — SIGNIFICANT CHANGE UP (ref 0.7–1.5)
DIFF PNL FLD: NEGATIVE — SIGNIFICANT CHANGE UP
EOSINOPHIL # BLD AUTO: 0.1 K/UL — SIGNIFICANT CHANGE UP (ref 0–0.7)
EOSINOPHIL NFR BLD AUTO: 1.9 % — SIGNIFICANT CHANGE UP (ref 0–8)
GLUCOSE SERPL-MCNC: 153 MG/DL — HIGH (ref 70–99)
GLUCOSE UR QL: NEGATIVE MG/DL — SIGNIFICANT CHANGE UP
HCT VFR BLD CALC: 39.6 % — SIGNIFICANT CHANGE UP (ref 37–47)
HGB BLD-MCNC: 12.3 G/DL — SIGNIFICANT CHANGE UP (ref 12–16)
IMM GRANULOCYTES NFR BLD AUTO: 1 % — HIGH (ref 0.1–0.3)
INR BLD: 1.02 RATIO — SIGNIFICANT CHANGE UP (ref 0.65–1.3)
KETONES UR-MCNC: NEGATIVE — SIGNIFICANT CHANGE UP
LEUKOCYTE ESTERASE UR-ACNC: ABNORMAL
LYMPHOCYTES # BLD AUTO: 0.71 K/UL — LOW (ref 1.2–3.4)
LYMPHOCYTES # BLD AUTO: 13.7 % — LOW (ref 20.5–51.1)
MCHC RBC-ENTMCNC: 29.1 PG — SIGNIFICANT CHANGE UP (ref 27–31)
MCHC RBC-ENTMCNC: 31.1 G/DL — LOW (ref 32–37)
MCV RBC AUTO: 93.6 FL — SIGNIFICANT CHANGE UP (ref 81–99)
MONOCYTES # BLD AUTO: 0.35 K/UL — SIGNIFICANT CHANGE UP (ref 0.1–0.6)
MONOCYTES NFR BLD AUTO: 6.8 % — SIGNIFICANT CHANGE UP (ref 1.7–9.3)
NEUTROPHILS # BLD AUTO: 3.95 K/UL — SIGNIFICANT CHANGE UP (ref 1.4–6.5)
NEUTROPHILS NFR BLD AUTO: 76.2 % — HIGH (ref 42.2–75.2)
NITRITE UR-MCNC: NEGATIVE — SIGNIFICANT CHANGE UP
NRBC # BLD: 0 /100 WBCS — SIGNIFICANT CHANGE UP (ref 0–0)
PH UR: 7.5 — SIGNIFICANT CHANGE UP (ref 5–8)
PLATELET # BLD AUTO: 119 K/UL — LOW (ref 130–400)
POTASSIUM SERPL-MCNC: 5.7 MMOL/L — HIGH (ref 3.5–5)
POTASSIUM SERPL-SCNC: 5.7 MMOL/L — HIGH (ref 3.5–5)
PROT SERPL-MCNC: 7.6 G/DL — SIGNIFICANT CHANGE UP (ref 6–8)
PROT UR-MCNC: NEGATIVE MG/DL — SIGNIFICANT CHANGE UP
PROTHROM AB SERPL-ACNC: 11.7 SEC — SIGNIFICANT CHANGE UP (ref 9.95–12.87)
RBC # BLD: 4.23 M/UL — SIGNIFICANT CHANGE UP (ref 4.2–5.4)
RBC # FLD: 14.6 % — HIGH (ref 11.5–14.5)
SODIUM SERPL-SCNC: 131 MMOL/L — LOW (ref 135–146)
SP GR SPEC: 1.01 — SIGNIFICANT CHANGE UP (ref 1.01–1.03)
TROPONIN T SERPL-MCNC: <0.01 NG/ML — SIGNIFICANT CHANGE UP
UROBILINOGEN FLD QL: 0.2 MG/DL — SIGNIFICANT CHANGE UP (ref 0.2–0.2)
WBC # BLD: 5.18 K/UL — SIGNIFICANT CHANGE UP (ref 4.8–10.8)
WBC # FLD AUTO: 5.18 K/UL — SIGNIFICANT CHANGE UP (ref 4.8–10.8)

## 2020-07-01 PROCEDURE — 99285 EMERGENCY DEPT VISIT HI MDM: CPT

## 2020-07-01 PROCEDURE — 71045 X-RAY EXAM CHEST 1 VIEW: CPT | Mod: 26

## 2020-07-01 NOTE — ED ADULT TRIAGE NOTE - CHIEF COMPLAINT QUOTE
pt BIBA from home, as per patients daughter pt was c/o pain to her "pacemaker" site all day, then was sitting in a chair when she dropped to the floor and began to shake for aprox 30 seconds, upon EMS arrival pt was disoriented, pt now a&ox3.

## 2020-07-02 ENCOUNTER — TRANSCRIPTION ENCOUNTER (OUTPATIENT)
Age: 60
End: 2020-07-02

## 2020-07-02 DIAGNOSIS — R56.9 UNSPECIFIED CONVULSIONS: ICD-10-CM

## 2020-07-02 LAB
ANION GAP SERPL CALC-SCNC: 8 MMOL/L — SIGNIFICANT CHANGE UP (ref 7–14)
BACTERIA # UR AUTO: ABNORMAL
BUN SERPL-MCNC: 18 MG/DL — SIGNIFICANT CHANGE UP (ref 10–20)
CALCIUM SERPL-MCNC: 8.7 MG/DL — SIGNIFICANT CHANGE UP (ref 8.5–10.1)
CHLORIDE SERPL-SCNC: 101 MMOL/L — SIGNIFICANT CHANGE UP (ref 98–110)
CO2 SERPL-SCNC: 25 MMOL/L — SIGNIFICANT CHANGE UP (ref 17–32)
COD CRY URNS QL: NEGATIVE — SIGNIFICANT CHANGE UP
CREAT SERPL-MCNC: 1.3 MG/DL — SIGNIFICANT CHANGE UP (ref 0.7–1.5)
EPI CELLS # UR: ABNORMAL /HPF
GLUCOSE BLDC GLUCOMTR-MCNC: 142 MG/DL — HIGH (ref 70–99)
GLUCOSE SERPL-MCNC: 90 MG/DL — SIGNIFICANT CHANGE UP (ref 70–99)
GRAN CASTS # UR COMP ASSIST: NEGATIVE — SIGNIFICANT CHANGE UP
HYALINE CASTS # UR AUTO: NEGATIVE — SIGNIFICANT CHANGE UP
MAGNESIUM SERPL-MCNC: 1.7 MG/DL — LOW (ref 1.8–2.4)
POTASSIUM SERPL-MCNC: 4 MMOL/L — SIGNIFICANT CHANGE UP (ref 3.5–5)
POTASSIUM SERPL-SCNC: 4 MMOL/L — SIGNIFICANT CHANGE UP (ref 3.5–5)
RBC CASTS # UR COMP ASSIST: NEGATIVE — SIGNIFICANT CHANGE UP
SARS-COV-2 IGG SERPL QL IA: NEGATIVE — SIGNIFICANT CHANGE UP
SARS-COV-2 IGM SERPL IA-ACNC: <0.1 INDEX — SIGNIFICANT CHANGE UP
SARS-COV-2 RNA SPEC QL NAA+PROBE: SIGNIFICANT CHANGE UP
SODIUM SERPL-SCNC: 134 MMOL/L — LOW (ref 135–146)
TACROLIMUS SERPL-MCNC: 5 NG/ML — SIGNIFICANT CHANGE UP
TRI-PHOS CRY UR QL COMP ASSIST: NEGATIVE — SIGNIFICANT CHANGE UP
URATE CRY FLD QL MICRO: NEGATIVE — SIGNIFICANT CHANGE UP
WBC UR QL: SIGNIFICANT CHANGE UP /HPF

## 2020-07-02 PROCEDURE — 70450 CT HEAD/BRAIN W/O DYE: CPT | Mod: 26

## 2020-07-02 PROCEDURE — 99223 1ST HOSP IP/OBS HIGH 75: CPT

## 2020-07-02 PROCEDURE — 99222 1ST HOSP IP/OBS MODERATE 55: CPT

## 2020-07-02 RX ORDER — IBUPROFEN 200 MG
400 TABLET ORAL EVERY 6 HOURS
Refills: 0 | Status: DISCONTINUED | OUTPATIENT
Start: 2020-07-02 | End: 2020-07-04

## 2020-07-02 RX ORDER — URSODIOL 250 MG/1
600 TABLET, FILM COATED ORAL EVERY 12 HOURS
Refills: 0 | Status: DISCONTINUED | OUTPATIENT
Start: 2020-07-02 | End: 2020-07-04

## 2020-07-02 RX ORDER — ASPIRIN/CALCIUM CARB/MAGNESIUM 324 MG
81 TABLET ORAL DAILY
Refills: 0 | Status: DISCONTINUED | OUTPATIENT
Start: 2020-07-02 | End: 2020-07-04

## 2020-07-02 RX ORDER — TACROLIMUS 5 MG/1
2 CAPSULE ORAL
Refills: 0 | Status: DISCONTINUED | OUTPATIENT
Start: 2020-07-02 | End: 2020-07-04

## 2020-07-02 RX ORDER — TACROLIMUS 5 MG/1
0 CAPSULE ORAL
Qty: 0 | Refills: 0 | DISCHARGE

## 2020-07-02 RX ORDER — PANTOPRAZOLE SODIUM 20 MG/1
0 TABLET, DELAYED RELEASE ORAL
Qty: 0 | Refills: 0 | DISCHARGE

## 2020-07-02 RX ORDER — HEPARIN SODIUM 5000 [USP'U]/ML
5000 INJECTION INTRAVENOUS; SUBCUTANEOUS EVERY 8 HOURS
Refills: 0 | Status: DISCONTINUED | OUTPATIENT
Start: 2020-07-02 | End: 2020-07-04

## 2020-07-02 RX ORDER — PANTOPRAZOLE SODIUM 20 MG/1
40 TABLET, DELAYED RELEASE ORAL
Refills: 0 | Status: DISCONTINUED | OUTPATIENT
Start: 2020-07-02 | End: 2020-07-04

## 2020-07-02 RX ORDER — TACROLIMUS 5 MG/1
1 CAPSULE ORAL DAILY
Refills: 0 | Status: DISCONTINUED | OUTPATIENT
Start: 2020-07-02 | End: 2020-07-02

## 2020-07-02 RX ORDER — SODIUM CHLORIDE 9 MG/ML
1000 INJECTION INTRAMUSCULAR; INTRAVENOUS; SUBCUTANEOUS
Refills: 0 | Status: DISCONTINUED | OUTPATIENT
Start: 2020-07-02 | End: 2020-07-04

## 2020-07-02 RX ORDER — ACETAMINOPHEN 500 MG
650 TABLET ORAL EVERY 6 HOURS
Refills: 0 | Status: DISCONTINUED | OUTPATIENT
Start: 2020-07-02 | End: 2020-07-04

## 2020-07-02 RX ORDER — URSODIOL 250 MG/1
500 TABLET, FILM COATED ORAL EVERY 12 HOURS
Refills: 0 | Status: DISCONTINUED | OUTPATIENT
Start: 2020-07-02 | End: 2020-07-02

## 2020-07-02 RX ORDER — CHLORHEXIDINE GLUCONATE 213 G/1000ML
1 SOLUTION TOPICAL
Refills: 0 | Status: DISCONTINUED | OUTPATIENT
Start: 2020-07-02 | End: 2020-07-04

## 2020-07-02 RX ADMIN — HEPARIN SODIUM 5000 UNIT(S): 5000 INJECTION INTRAVENOUS; SUBCUTANEOUS at 06:29

## 2020-07-02 RX ADMIN — HEPARIN SODIUM 5000 UNIT(S): 5000 INJECTION INTRAVENOUS; SUBCUTANEOUS at 21:04

## 2020-07-02 RX ADMIN — URSODIOL 600 MILLIGRAM(S): 250 TABLET, FILM COATED ORAL at 17:40

## 2020-07-02 RX ADMIN — Medication 81 MILLIGRAM(S): at 11:43

## 2020-07-02 RX ADMIN — PANTOPRAZOLE SODIUM 40 MILLIGRAM(S): 20 TABLET, DELAYED RELEASE ORAL at 06:28

## 2020-07-02 RX ADMIN — SODIUM CHLORIDE 75 MILLILITER(S): 9 INJECTION INTRAMUSCULAR; INTRAVENOUS; SUBCUTANEOUS at 21:12

## 2020-07-02 RX ADMIN — TACROLIMUS 2 MILLIGRAM(S): 5 CAPSULE ORAL at 17:40

## 2020-07-02 RX ADMIN — HEPARIN SODIUM 5000 UNIT(S): 5000 INJECTION INTRAVENOUS; SUBCUTANEOUS at 14:15

## 2020-07-02 NOTE — H&P ADULT - NSHPPHYSICALEXAM_GEN_ALL_CORE
vitals ICU Vital Signs Last 24 Hrs  T(C): 37 (02 Jul 2020 01:47), Max: 37 (02 Jul 2020 01:47)  T(F): 98.6 (02 Jul 2020 01:47), Max: 98.6 (02 Jul 2020 01:47)  HR: 62 (02 Jul 2020 01:47) (62 - 80)  BP: 131/71 (02 Jul 2020 01:47) (131/71 - 162/74)  RR: 17 (02 Jul 2020 01:47) (17 - 18)  SpO2: 100% (02 Jul 2020 01:47) (100% - 100%)    Constitutional: well-nourished, appears stated age, no acute distress.  HEENT: Airway patent, protected, pupils 3-4 mm bilaterally reactive. NC/AT.   CV: regular rate, regular rhythm, well-perfused extremities, 2+ b/l DP and radial pulses equal.  Lungs: BCTA, no increased WOB.  ABD: soft, NTND, no guarding or rebound, no pulsatile mass, no hernias.   MSK: Neck supple, nontender, nl ROM, no stepoff. Chest nontender. Back nontender in TLS spine or to b/l bony structures or flanks. Ext nontender, nl rom, no deformity.   INTEG: Skin warm, dry, no rash.  NEURO: A&Ox3, moving all extremities, normal speech  PSYCH: Calm, cooperative, normal affect and interaction.

## 2020-07-02 NOTE — H&P ADULT - ASSESSMENT
58 yo F hx of CVA 2018 no deficits, pacemaker, liver transplant in 2006 on prograf presents with seizure    admit to medicine   neurology consulted - f/u recommendations   VEEG   monitor for seizure   f/u labs   mild hyponatremia - IVF, will f/u am BMP   continue home meds - prograf   heart healthy diet   dvt ppx 58 yo F hx of CVA 2018 no deficits, pacemaker, liver transplant in 2006 on prograf presents with seizure    admit to medicine   neurology consulted - f/u recommendations   VEEG   monitor for seizure   f/u labs   troponin negative x 1  mild hyponatremia - IVF, will f/u am BMP   continue home meds - prograf   heart healthy diet   dvt ppx

## 2020-07-02 NOTE — ED PROVIDER NOTE - ATTENDING CONTRIBUTION TO CARE
59F h/o cva, aicd, liver transplant 2008 on prograf p/w syncope vs seizure. Per daughter-in-law who was with pt she was c/o feeling lightheaded with some discomfort around her aicd site (no shocks), then syncopized from chair and fell to floor and was shaking <1 min, and then was awake but unresponsive / post-ictal. No tongue biting or bladder incontinence. Per ems pt was not talking en route, currently @ baseline in ED. Rpts still feeling lightheaded with cp. No ha, blurry vision, sob/peterson, cough, nvd, abd pain, flank or back pain, focal numbness or weakness. No h/o seizure d/o/AED in past. PMD Geoffrey / Cardio @ [facility].    PE:  nad  skin warm, dry  ncat  neck supple  L chest wall aicd, site c/d no signs of erythema or infection  rrr nl s1s2 no mrg  ctab no wrr  abd soft ntnd no palpable masses no rgr  back non-tender no cvat  ext no cce dpi  neuro aaox3 grossly nf exam

## 2020-07-02 NOTE — ED PROVIDER NOTE - EMPLOYMENT
2019         RE: Shelbi Howard  [id number] [facility] [address]        Dear Colleague,    Thank you for referring your patient, Shelbi Howard, to the [facility]. Please see a copy of my visit note below.                Follow Up Notes on Referred Patient    Date: 2019        RE: Shelbi Howard  : 1957  MARISSA: 2019      Shelbi Howard is a 62 year old woman with a diagnosis of stage IA grade 1 endometrioid endometrial adenocarcinoma. She is here today for a surveillance visit.      Oncology history:  10/17/17 Presented to PCP with postmenopausal bleeding.  US notable for EMS of 1.1 cm.  Endometrial biopsy with Grade 1 endometrioid adenocarcinoma     17: DaVinci Assisted Total Laparoscopic Hysterectomy, Right Salpingo-Oophorectomy, Lysis of Adhesions, Cystoscopy  SPECIMEN(S):   Cervix, uterus, right ovary fallopian tube     FINAL DIAGNOSIS:   CERVIX, UTERUS, RIGHT OVARY AND FALLOPIAN TUBE, HYSTERECTOMY AND RIGHT   SALPINGO-OOPHORECTOMY:   - Endometrial endometrioid adenocarcinoma, FIGO grade 1, arising in endometrial polyp with atypical hyperplasia   - Myometrium with no significant histologic abnormality   - Cervix with Nabothian cysts   - Ovary and fallopian tube with no significant histologic abnormality            MLH1 Expression:             - Loss of nuclear expression         MSH2 Expression:             - Intact nuclear expression         MSH6 Expression:             - Intact nuclear expression         PMS2 Expression:             - Loss of nuclear expression     MLH1 promoter methylation positive.           Today she comes to clinic and denies any concerns for her visit. She denies any vaginal bleeding, no changes in her bowel or bladder habits, no nausea/emesis, no lower extremity edema, and no difficulties eating or sleeping. She denies any abdominal discomfort/bloating, no fevers or chills, and no chest pain or shortness of breath. She reports she has had  her abdominal hernia for about 15 years; she has not noticed any changes in it. She monitors her BP at home and reports normal readings. She sees her PCP about every 6 months and is current with her health screenings; she states she discussed seeing her PCP for her pelvic surveillance and she is agreeable with this. She is not sexually active and denies any dryness.           Review of Systems:    Systemic           no weight changes; no fever; no chills; no night sweats; no appetite changes  Skin           no rashes, or lesions  Eye           no irritation; no changes in vision  Gordon-Laryngeal           no dysphagia; no hoarseness   Pulmonary    no cough; no shortness of breath  Cardiovascular    no chest pain; no palpitations; + HTN  Gastrointestinal    no diarrhea; no constipation; no abdominal pain; no changes in bowel habits; no blood in stool  Genitourinary   no urinary frequency; no urinary urgency; no dysuria; no pain; no abnormal vaginal discharge; no abnormal vaginal bleeding  Breast    no breast discharge; no breast changes; no breast pain  Musculoskeletal    no myalgias; no arthralgias; no back pain  Psychiatric           no depressed mood; no anxiety    Hematologic              no tender lymph nodes; no noticeable swellings or lumps   Endocrine    no hot flashes; no heat/cold intolerance         Neurological   no tremor; no numbness and tingling; no headaches; no difficulty sleeping      Past Medical History:    Past Medical History:   Diagnosis Date     ASCUS of cervix with negative high risk HPV 10/17/2017     Diabetes (H)     oral meds     Endometrial cancer (H)      HTN (hypertension)      Migraine      Obesity          Past Surgical History:    Past Surgical History:   Procedure Laterality Date     CYSTOSCOPY N/A 12/5/2017    Procedure: CYSTOSCOPY;;  Surgeon: Facundo Parekh MD;  Location: [facility] OR     DAVINCI HYSTERECTOMY TOTAL, BILATERAL SALPINGO-OOPHORECTOMY, NODE DISSECTION, COMBINED N/A  2017    Procedure: COMBINED DAVINCI HYSTERECTOMY TOTAL, SALPINGO-OOPHORECTOMY, NODE DISSECTION;  DaVinci Assisted Total Laparoscopic Hysterectomy, Right Salpingo-Oophorectomy, Lysis of Adhesions, Cystoscopy;  Surgeon: Facundo Parekh MD;  Location: [facility] OR     LAPAROSCOPIC HYSTERECTOMY TOTAL  2017     oopherectomy Left     13 lb benign mass removal         Health Maintenance Due   Topic Date Due     ZOSTER IMMUNIZATION (1 of 2) 2008     INFLUENZA VACCINE (1) 2019       Current Medications:     Current Outpatient Medications   Medication Sig Dispense Refill     alcohol swab prep pads Use to swab area of injection/jennifer as directed. 100 each 0     aspirin 81 MG EC tablet Take 1 tablet (81 mg) by mouth daily 90 tablet 3     blood glucose monitoring (VI CONTOUR NEXT) test strip Use to test blood sugar 2 times daily. 100 strip 3     blood glucose monitoring (VI MICROLET) lancets Use to test blood sugar 2 times daily. 100 each 3     lisinopril-hydrochlorothiazide (PRINZIDE/ZESTORETIC) 10-12.5 MG tablet Take 1 tablet by mouth daily 90 tablet 1     metFORMIN (GLUCOPHAGE) 1000 MG tablet TAKE 1 TABLET BY MOUTH TWICE DAILY WITH MEALS 180 tablet 1     simvastatin (ZOCOR) 10 MG tablet TAKE 1 TABLET BY MOUTH AT BEDTIME 90 tablet 3         Allergies:        Allergies   Allergen Reactions     Latex Itching        Social History:     Social History     Tobacco Use     Smoking status: Former Smoker     Packs/day: 0.50     Years: 38.00     Pack years: 19.00     Types: Cigarettes     Last attempt to quit: 2017     Years since quittin.5     Smokeless tobacco: Never Used   Substance Use Topics     Alcohol use: Yes     Comment: 4 x times       History   Drug Use No         Family History:     The patient's family history is notable for:    Family History   Problem Relation Age of Onset     Diabetes Brother      Influenza/Pneumonia Father 62     Kidney Disease Sister          Physical Exam:     BP  "125/72 (BP Location: Right arm)   Pulse 56   Temp 98.1  F (36.7  C) (Oral)   Resp 18   Ht 1.575 m (5' 2.01\")   Wt 83.1 kg (183 lb 3.2 oz)   SpO2 98%   BMI 33.50 kg/m     Body mass index is 33.5 kg/m .    General Appearance: healthy and alert, no distress     HEENT: no thyromegaly, no palpable nodules or masses        Cardiovascular: regular rate and rhythm, no gallops, rubs or murmurs     Respiratory: lungs clear, no rales, rhonchi or wheezes, normal diaphragmatic excursion    Musculoskeletal: extremities non tender and without edema    Skin: no lesions or rashes     Neurological: normal gait, no gross defects     Psychiatric: appropriate mood and affect                               Hematological: normal cervical, supraclavicular and inguinal lymph nodes     Gastrointestinal:       abdomen soft, non-tender, non-distended, no organomegaly or masses other than know large ventral hernia    Genitourinary: External genitalia and urethral meatus appears normal.  Vagina is smooth without nodularity or masses.  Cervix surgically absent.  Bimanual exam reveal no masses, nodularity or fullness other than noted above.  Recto-vaginal exam confirms these findings.      Assessment:    Shelbi Howard is a 62 year old woman with a diagnosis of stage IA grade 1 endometrioid endometrial adenocarcinoma. She is here today for a surveillance visit.      20 minutes were spent with this patient, over 50% of that time was spent in symptom management, treatment planning and in counseling and coordination of care.      Plan:     1.)        Discussed she can extend her surveillance to annually or return in 6 months for her next visit; she would like to extend to annual exams which she will have with her local provider (her annual exam is 3/2020 so she can have a pelvic exam at that time).  Reviewed recommendations from [facility] not to perform surveillance pap smears in women diagnosed with endometrial cancer as this does not improve " detection of local recurrence. Reviewed signs and symptoms for when she should contact the clinic or seek additional care. Patient to contact the clinic with any questions or concerns.     2.) Genetic risk factors were assessed and her MLH1 promoter methylation is positive.        3.) Labs and/or tests ordered include:  None.      4.) Health maintenance issues addressed today include annual health maintenance and non-gynecologic issues with PCP.    BERLIN Conde, WHNP-BC, ANP-BC  Women's Health Nurse Practitioner  Adult Nurse Pracitioner  Division of Gynecologic Oncology          CC  Patient Care Team:  No Ref-Primary, Physician as PCP - Halie Padilla APRN CNP as Assigned PCP      Again, thank you for allowing me to participate in the care of your patient.        Sincerely,        BERLIN Dang CNP     N/A

## 2020-07-02 NOTE — H&P ADULT - NSICDXPASTMEDICALHX_GEN_ALL_CORE_FT
PAST MEDICAL HISTORY:  Acid reflux     CVA (cerebral vascular accident)     Hemiparesis due to old stroke     Liver transplanted

## 2020-07-02 NOTE — CHART NOTE - NSCHARTNOTEFT_GEN_A_CORE
Patient does not have list of her medications though she confirms that she takes "Prograf" Her pharmacy is (apparently) mail order though she  said [facility] [address] knows the list.  Although that pharmacy is closed I just called a "24 hour" [facility] on [address] but voice message stated their pharmacy opens at 0700. Patient has texted her daughter in law to make a list adding that she doesn't take any of her medications until 1000. Will inform day staff of same
Pt seen and examined at bedside. Discussed VEEG, symptoms.    PHYSICAL EXAM:  GENERAL: NAD, speaks in full sentences, no signs of respiratory distress  HEAD:  Atraumatic, Normocephalic  EYES: EOMI, PERRLA, conjunctiva and sclera clear  NECK: Supple, No JVD  CHEST/LUNG: Clear to auscultation bilaterally; No wheeze; No crackles; No accessory muscles used  HEART: Regular rate and rhythm; No murmurs;   ABDOMEN: Soft, Nontender, Nondistended; Bowel sounds present; No guarding  EXTREMITIES:  2+ Peripheral Pulses, No cyanosis or edema  PSYCH: AAOx3  NEUROLOGY: non-focal  SKIN: No rashes or lesions    Will follow up VEEG
Requested by the night staff to verify medications with Pharmacy - [facility] [place]   Spoke with Pharmacist who recommends   - Ursodiol 500 1 cap BID   - Tacrolimus 1mg 2 caps BID   - Pantoprazole 40mg QD   - Magnesium Oxide 400 1 cap QD   - ASA 81 1 tab QD     Corrected the Home meds section   Updated and ordered on Med rec     Called by Pharmacy. States we only have Ursodiol 300mg caps. Therefore, we will give Ursodiol 300 2 caps BID
Neurology/Epilepsy NP:    Patient has a loop recorder that was implanted by Dr. Cesar Lancaster in January 2018 at [facility].  As per Dr. Lancaster office, the battery is depleted and there was no transmissions since March 2020.

## 2020-07-02 NOTE — ED ADULT NURSE NOTE - INTERVENTIONS DEFINITIONS
Industry to call system/Physically safe environment: no spills, clutter or unnecessary equipment/Stretcher in lowest position, wheels locked, appropriate side rails in place

## 2020-07-02 NOTE — ED PROVIDER NOTE - PHYSICAL EXAMINATION
Vital Signs: I have reviewed the initial vital signs.  Constitutional: well-nourished, appears stated age, no acute distress.  HEENT: Airway patent, protected, pupils 3-4 mm bilaterally reactive. NC/AT.   CV: regular rate, regular rhythm, well-perfused extremities, 2+ b/l DP and radial pulses equal.  Lungs: BCTA, no increased WOB.  ABD: soft, NTND, no guarding or rebound, no pulsatile mass, no hernias.   MSK: Neck supple, nontender, nl ROM, no stepoff. Chest nontender. Back nontender in TLS spine or to b/l bony structures or flanks. Ext nontender, nl rom, no deformity.   INTEG: Skin warm, dry, no rash.  NEURO: A&Ox3, moving all extremities, normal speech  PSYCH: Calm, cooperative, normal affect and interaction.

## 2020-07-02 NOTE — ED PROVIDER NOTE - NS ED ROS FT
Review of Systems    Constitutional: (-) fever  Cardiovascular: (-) chest pain, (+) syncope  Respiratory: (-) cough, (-) shortness of breath  Gastrointestinal: (-) vomiting, (-) diarrhea, (-) abdominal pain  Musculoskeletal: (-) neck pain, (-) back pain, (-) joint pain  Integumentary: (-) rash, (-) edema  Neurological: (-) headache, (-) altered mental status    Except as documented in the HPI, all other systems are negative.

## 2020-07-02 NOTE — CONSULT NOTE ADULT - SUBJECTIVE AND OBJECTIVE BOX
Neurology Consultation note    Name  GOLDY LUCAS    HPI:  pt is a 60 yo F hx of CVA 2018 no deficits, pacemaker, liver transplant in 2006 on prograf presents with seizure activity. Per daughter-in-law, patient was feeling light-headedness and passed out in front of her, denies head trauma. As per report, patient fell to the ground and started shaking for 30 seconds on own, no incontinence no tongue biting, no prior seizure hx. no slurred speech or facial droop. denies fever, chills, cp, sob, N/V/D dysuria, melena. pt AAOx3, no AMS or post ictal (02 Jul 2020 03:47)      NEURO:  Patient is a 60 yo female with hx as above last seen by me in march for dysarthria in setting of low Mg (CVA w/u neg) who was adm for seizure  patient had a HA all day. she was lying on the couch when her daughter in law noted her making a funny sound. she fell from the couch and had convulsive activity x 30 sec. some confusion after the event until ems arrived  no tongue biting or incontinence  no focal complaints  pt denies hx of sz  has loop recorder implanted        Vital Signs Last 24 Hrs  T(C): 36.6 (02 Jul 2020 05:37), Max: 37 (02 Jul 2020 01:47)  T(F): 97.8 (02 Jul 2020 05:37), Max: 98.6 (02 Jul 2020 01:47)  HR: 68 (02 Jul 2020 07:38) (60 - 80)  BP: 115/59 (02 Jul 2020 05:37) (115/59 - 162/74)  BP(mean): --  RR: 20 (02 Jul 2020 07:38) (16 - 20)  SpO2: 98% (02 Jul 2020 07:38) (98% - 100%)    Neurological Exam:   Mental status: Awake, alert and oriented x3.  Recent and remote memory intact.  Naming, repetition and comprehension intact.  Attention/concentration intact.  No dysarthria, no aphasia.  Fund of knowledge appropriate.    Cranial nerves: Pupils equally round and reactive to light, visual fields full, no nystagmus, extraocular muscles intact, V1 through V3 intact bilaterally and symmetric, face symmetric, hearing intact to finger rub, palate elevation symmetric, tongue was midline.  Motor:  MRC grading 5/5 b/l UE/LE.   strength 5/5.  Normal tone and bulk.  No abnormal movements.    Sensation: Intact to light touch, proprioception, and pinprick.   Coordination: No dysmetria on finger-to-nose and heel-to-shin.  No dysdiadokinesia.  Reflexes: 2+ in bilateral UE/LE, downgoing toes bilaterally. (-) Hearn.      Medications  acetaminophen   Tablet .. 650 milliGRAM(s) Oral every 6 hours PRN  aspirin  chewable 81 milliGRAM(s) Oral daily  chlorhexidine 4% Liquid 1 Application(s) Topical <User Schedule>  heparin   Injectable 5000 Unit(s) SubCutaneous every 8 hours  ibuprofen  Tablet. 400 milliGRAM(s) Oral every 6 hours PRN  LORazepam   Injectable 2 milliGRAM(s) IV Push once PRN  pantoprazole    Tablet 40 milliGRAM(s) Oral before breakfast  sodium chloride 0.9%. 1000 milliLiter(s) IV Continuous <Continuous>  tacrolimus 2 milliGRAM(s) Oral two times a day  ursodiol Capsule 600 milliGRAM(s) Oral every 12 hours      Lab  07-02    134<L>  |  101  |  18  ----------------------------<  90  4.0   |  25  |  1.3    Ca    8.7      02 Jul 2020 07:10  Mg     1.7     07-02    TPro  7.6  /  Alb  4.3  /  TBili  0.6  /  DBili  x   /  AST  51<H>  /  ALT  23  /  AlkPhos  134<H>  07-01                          12.3   5.18  )-----------( 119      ( 01 Jul 2020 22:48 )             39.6     LIVER FUNCTIONS - ( 01 Jul 2020 22:48 )  Alb: 4.3 g/dL / Pro: 7.6 g/dL / ALK PHOS: 134 U/L / ALT: 23 U/L / AST: 51 U/L / GGT: x             1.7        Radiology  CT Head No Cont:   EXAM:  CT BRAIN            PROCEDURE DATE:  07/02/2020            INTERPRETATION:  CLINICAL HISTORY / REASON FOR EXAM: Syncope. History of cerebrovascular accident.    TECHNIQUE: Multiple axial CT images of the head were obtained from the base of the skull to the vertex without the administration of IV contrast. Sagittal and coronal reformats were submitted.    COMPARISON: CT head without contrast from 3/5/2020.      FINDINGS:    The ventricles and cortical sulci are within normal limits.     There is no evidence of acute intracranial hemorrhage, mass effect or midline shift.    Gray-white differentiation is maintained.     The bones are intact. Mastoid air cells are well aerated bilaterally. The visualized portions of the sinuses are unremarkable.    Beam hardening artifact is noted overlying the brain stem and posterior fossa which is inherent to CT in this location.      IMPRESSION:     No CT evidence of acute intracranial pathology.               DAWOOD SANDERS M.D., RESIDENT RADIOLOGIST  This document has been electronically signed.  VENANCIO BAKER M.D., ATTENDING RADIOLOGIST  This document has been electronically signed. Jul 2 2020  1:41AM             (07-02-20 @ 00:53)      Assessment:  60 yo female with hx as above adm with witnessed 30 sec gtc sz  briefly post ictal at home  now at normal baseline  Mg 1.7  cth no acute change    Plan:  admit for VEEG  replete Mg and maintain > 2  no aeds for now  ativan prn only  epilepsy team aware  please call with any questions

## 2020-07-02 NOTE — H&P ADULT - NSHPREVIEWOFSYSTEMS_GEN_ALL_CORE
Constitutional: (-) fever (-) chills   Eyes: (-) visual changes  ENMT: (-) nasal or chest congestion (-) runny nose (-) sore throat (-) neck pain (-) neck stiffness  Cardiac: (-) chest pain (+) syncope  Respiratory: (-) cough (-) SOB  GI: (-) nausea (-) vomiting (-) diarrhea  : (-) incontinence  MS:(-) back pain   Neuro: (-) head injury (-) headache (-) dizziness (-) numbness/tingling to extremities B/L (+) LOC   Skin: (-) abrasion (-) rash (-) laceration  Except as documented in the HPI, all other systems are negative.

## 2020-07-02 NOTE — ED PROVIDER NOTE - OBJECTIVE STATEMENT
59F hx of pacemaker/CVA/liver transplant in 2006 on prograf presents with syncope. Per daughter-in-law, patient was feeling light-headedness and passed out in front of her, denies head trauma. Patient then began "shaking" on the ground, no hx of previous seizure, not currently on AEDs. Patient seized for less than 1 min, is back to baseline on presentation to the ED, now only complaining of light-headedness and mild left sided chest pain that is nonradiating/nonexertional/intermittent. She denies fever/cough/shortness of breath/abdominal pain/vomiting/diarrhea/flank pain/dysuria/leg swelling.

## 2020-07-02 NOTE — ED PROVIDER NOTE - CLINICAL SUMMARY MEDICAL DECISION MAKING FREE TEXT BOX
seizure vs. syncope, aaox3 & neuro exam nf in ED - ekg, cxr, labs, cth unremarkable - d/w neuro, rec to chk mg level, admit for eeg

## 2020-07-02 NOTE — H&P ADULT - ATTENDING COMMENTS
Patient seen independently from PA Agree with plans Patient seen independently from PA. For neurology consult (EEG?) Patient seen independently from PA, 58yo female whose PMH includes CVA, liver transplant status and PPM status presents for seizure evaluation. Agree with H+P and plan outlined above. For neurology consult (vEEG?) Meanwhile will repeat potassium level and get magnesium level and tacrolimus level this am Patient seen independently from PA, 60yo female whose PMH includes CVA, liver transplant status and PPM status (I looked at CXR and see a device on left side of chest) presents for seizure evaluation. Agree with H+P and plan outlined above. For neurology consult (vEEG?) Meanwhile will repeat potassium level and get magnesium level and tacrolimus level this am

## 2020-07-02 NOTE — H&P ADULT - NSHPLABSRESULTS_GEN_ALL_CORE
12.3   5.18  )-----------( 119      ( 2020 22:48 )             39.6     Urinalysis Basic - ( 2020 23:30 )    Color: Yellow / Appearance: Clear / S.015 / pH: x  Gluc: x / Ketone: Negative  / Bili: Negative / Urobili: 0.2 mg/dL   Blood: x / Protein: Negative mg/dL / Nitrite: Negative   Leuk Esterase: Moderate / RBC: Negative / WBC 3-5 /HPF   Sq Epi: x / Non Sq Epi: Few /HPF / Bacteria: Few          131<L>  |  98  |  20  ----------------------------<  153<H>  5.7<H>   |  21  |  1.4    Ca    8.8      2020 22:48    TPro  7.6  /  Alb  4.3  /  TBili  0.6  /  DBili  x   /  AST  51<H>  /  ALT  23  /  AlkPhos  134<H>  07    < from: CT Head No Cont (20 @ 00:53) >    FINDINGS:    The ventricles and cortical sulci are within normal limits.     There is no evidence of acute intracranial hemorrhage, mass effect or midline shift.    Gray-white differentiation is maintained.     The bones are intact. Mastoid air cells are well aerated bilaterally. The visualized portions of the sinuses are unremarkable.    Beam hardening artifact is noted overlying the brain stem and posterior fossa which is inherent to CT in this location.      IMPRESSION:     No CT evidence of acute intracranial pathology.     < end of copied text >

## 2020-07-02 NOTE — H&P ADULT - HISTORY OF PRESENT ILLNESS
pt is a 58 yo F hx of CVA 2018 no deficits, pacemaker, liver transplant in 2006 on prograf presents with seizure activity. Per daughter-in-law, patient was feeling light-headedness and passed out in front of her, denies head trauma. As per report, patient fell to the ground and started shaking for 30 seconds on own, no incontinence no tongue biting, no prior seizure hx. no slurred speech or facial droop. denies fever, chills, cp, sob, N/V/D dysuria, melena. pt AAOx3, no AMS or post ictal

## 2020-07-03 LAB
ANION GAP SERPL CALC-SCNC: 8 MMOL/L — SIGNIFICANT CHANGE UP (ref 7–14)
BUN SERPL-MCNC: 17 MG/DL — SIGNIFICANT CHANGE UP (ref 10–20)
CALCIUM SERPL-MCNC: 8.8 MG/DL — SIGNIFICANT CHANGE UP (ref 8.5–10.1)
CHLORIDE SERPL-SCNC: 106 MMOL/L — SIGNIFICANT CHANGE UP (ref 98–110)
CO2 SERPL-SCNC: 24 MMOL/L — SIGNIFICANT CHANGE UP (ref 17–32)
CREAT SERPL-MCNC: 1.2 MG/DL — SIGNIFICANT CHANGE UP (ref 0.7–1.5)
CULTURE RESULTS: SIGNIFICANT CHANGE UP
GLUCOSE SERPL-MCNC: 91 MG/DL — SIGNIFICANT CHANGE UP (ref 70–99)
MAGNESIUM SERPL-MCNC: 1.9 MG/DL — SIGNIFICANT CHANGE UP (ref 1.8–2.4)
POTASSIUM SERPL-MCNC: 4.3 MMOL/L — SIGNIFICANT CHANGE UP (ref 3.5–5)
POTASSIUM SERPL-SCNC: 4.3 MMOL/L — SIGNIFICANT CHANGE UP (ref 3.5–5)
SODIUM SERPL-SCNC: 138 MMOL/L — SIGNIFICANT CHANGE UP (ref 135–146)
SPECIMEN SOURCE: SIGNIFICANT CHANGE UP

## 2020-07-03 PROCEDURE — 99232 SBSQ HOSP IP/OBS MODERATE 35: CPT

## 2020-07-03 PROCEDURE — 95720 EEG PHY/QHP EA INCR W/VEEG: CPT

## 2020-07-03 PROCEDURE — 99233 SBSQ HOSP IP/OBS HIGH 50: CPT

## 2020-07-03 RX ORDER — LEVETIRACETAM 250 MG/1
500 TABLET, FILM COATED ORAL
Refills: 0 | Status: DISCONTINUED | OUTPATIENT
Start: 2020-07-03 | End: 2020-07-04

## 2020-07-03 RX ADMIN — Medication 30 MILLILITER(S): at 20:51

## 2020-07-03 RX ADMIN — TACROLIMUS 2 MILLIGRAM(S): 5 CAPSULE ORAL at 05:42

## 2020-07-03 RX ADMIN — HEPARIN SODIUM 5000 UNIT(S): 5000 INJECTION INTRAVENOUS; SUBCUTANEOUS at 21:22

## 2020-07-03 RX ADMIN — HEPARIN SODIUM 5000 UNIT(S): 5000 INJECTION INTRAVENOUS; SUBCUTANEOUS at 05:41

## 2020-07-03 RX ADMIN — Medication 30 MILLILITER(S): at 00:42

## 2020-07-03 RX ADMIN — Medication 650 MILLIGRAM(S): at 09:03

## 2020-07-03 RX ADMIN — SODIUM CHLORIDE 75 MILLILITER(S): 9 INJECTION INTRAMUSCULAR; INTRAVENOUS; SUBCUTANEOUS at 20:51

## 2020-07-03 RX ADMIN — Medication 81 MILLIGRAM(S): at 11:48

## 2020-07-03 RX ADMIN — HEPARIN SODIUM 5000 UNIT(S): 5000 INJECTION INTRAVENOUS; SUBCUTANEOUS at 14:40

## 2020-07-03 RX ADMIN — PANTOPRAZOLE SODIUM 40 MILLIGRAM(S): 20 TABLET, DELAYED RELEASE ORAL at 05:42

## 2020-07-03 RX ADMIN — LEVETIRACETAM 500 MILLIGRAM(S): 250 TABLET, FILM COATED ORAL at 14:39

## 2020-07-03 RX ADMIN — LEVETIRACETAM 500 MILLIGRAM(S): 250 TABLET, FILM COATED ORAL at 17:36

## 2020-07-03 RX ADMIN — TACROLIMUS 2 MILLIGRAM(S): 5 CAPSULE ORAL at 17:36

## 2020-07-03 RX ADMIN — URSODIOL 600 MILLIGRAM(S): 250 TABLET, FILM COATED ORAL at 17:36

## 2020-07-03 RX ADMIN — URSODIOL 600 MILLIGRAM(S): 250 TABLET, FILM COATED ORAL at 05:42

## 2020-07-03 RX ADMIN — Medication 30 MILLILITER(S): at 14:49

## 2020-07-03 NOTE — EEG REPORT - OTHER DETAILS
There were three episodes of arousal from sleep with appearance of nervousness. EEG shows rhythmic left temporal alpha/theta evolving into theta/delta. Duration 2-4 minutes.

## 2020-07-03 NOTE — EEG REPORT - NS EEG RECRD DROWSINESS Q5 RES
slowing of the background/attenuation of the posterior dominant rhythm, the emergence of diffuse theta activity and an increase in beta activity

## 2020-07-03 NOTE — PROGRESS NOTE ADULT - ASSESSMENT
58 yo F hx of CVA 2018 no deficits, loop recorder (placed 2 years ago) PBC s/p liver transplant in 2006 on prograf presents with seizure    Complex partial seizure left temporal.  - VEEG showed Complex partial seizure left temporal.  - awaiting further recommendations from neuro (whether to c/w VEEG vs dc and if pt requires AED)      PBC s/p liver transplant  - c/w ursodiol  - c/w prograf (level=5 which is therapeutic)    Loop recorder  - had it placed 2 years ago   - no event found    DVt px  Full Code  From home    #Progress Note Handoff:  Pending (specify):  neuro follow up  Family discussion: discussed EEG findings  Disposition: Home_x__/SNF___/Other________/Unknown at this time________

## 2020-07-04 ENCOUNTER — TRANSCRIPTION ENCOUNTER (OUTPATIENT)
Age: 60
End: 2020-07-04

## 2020-07-04 VITALS
HEART RATE: 69 BPM | TEMPERATURE: 96 F | RESPIRATION RATE: 16 BRPM | SYSTOLIC BLOOD PRESSURE: 128 MMHG | DIASTOLIC BLOOD PRESSURE: 60 MMHG

## 2020-07-04 PROCEDURE — 99231 SBSQ HOSP IP/OBS SF/LOW 25: CPT

## 2020-07-04 PROCEDURE — 95720 EEG PHY/QHP EA INCR W/VEEG: CPT

## 2020-07-04 PROCEDURE — 99238 HOSP IP/OBS DSCHRG MGMT 30/<: CPT

## 2020-07-04 RX ORDER — LEVETIRACETAM 250 MG/1
1 TABLET, FILM COATED ORAL
Qty: 60 | Refills: 0
Start: 2020-07-04 | End: 2020-08-02

## 2020-07-04 RX ADMIN — Medication 650 MILLIGRAM(S): at 03:31

## 2020-07-04 RX ADMIN — TACROLIMUS 2 MILLIGRAM(S): 5 CAPSULE ORAL at 05:02

## 2020-07-04 RX ADMIN — LEVETIRACETAM 500 MILLIGRAM(S): 250 TABLET, FILM COATED ORAL at 05:02

## 2020-07-04 RX ADMIN — HEPARIN SODIUM 5000 UNIT(S): 5000 INJECTION INTRAVENOUS; SUBCUTANEOUS at 05:02

## 2020-07-04 RX ADMIN — URSODIOL 600 MILLIGRAM(S): 250 TABLET, FILM COATED ORAL at 05:02

## 2020-07-04 RX ADMIN — Medication 81 MILLIGRAM(S): at 10:26

## 2020-07-04 RX ADMIN — PANTOPRAZOLE SODIUM 40 MILLIGRAM(S): 20 TABLET, DELAYED RELEASE ORAL at 05:03

## 2020-07-04 RX ADMIN — Medication 30 MILLILITER(S): at 03:34

## 2020-07-04 NOTE — PROGRESS NOTE ADULT - SUBJECTIVE AND OBJECTIVE BOX
Neurology Progress Note    Interval History:      Pt seen in f/u for possible seizure.  She describes laying on couch and losing consciousness then confusion afterwards.  Denies any more episodes however while on vEEG monitor apparently episodes of confusion and EEG abnormality captured  overnight suspicious for temporal lobe seizures.    Vital Signs Last 24 Hrs  T(C): 36.2 (2020 05:24), Max: 36.3 (2020 13:56)  T(F): 97.2 (2020 05:24), Max: 97.4 (2020 13:56)  HR: 58 (2020 05:24) (58 - 67)  BP: 117/68 (2020 05:24) (111/59 - 117/68)  BP(mean): --  RR: 16 (2020 05:24) (16 - 16)  SpO2: --    Neurological Exam:   Mental status: Awake, alert and oriented x3.   Cranial nerves: Pupils equal, visual fields full, symmetric facial muscles.  Motor:  Strength full in extremities and no pronator drift.  No asterixis seen..    Sensation: Intact to light touch and pain all 4 extremities.  Coordination: Normal finger to nose bilaterally.      MEDICATIONS  (STANDING):  aspirin  chewable 81 milliGRAM(s) Oral daily  chlorhexidine 4% Liquid 1 Application(s) Topical <User Schedule>  heparin   Injectable 5000 Unit(s) SubCutaneous every 8 hours  levETIRAcetam 500 milliGRAM(s) Oral two times a day  pantoprazole    Tablet 40 milliGRAM(s) Oral before breakfast  sodium chloride 0.9%. 1000 milliLiter(s) (75 mL/Hr) IV Continuous <Continuous>  tacrolimus 2 milliGRAM(s) Oral two times a day  ursodiol Capsule 600 milliGRAM(s) Oral every 12 hours        Labs:  CBC Full  -  ( 2020 22:48 )  WBC Count : 5.18 K/uL  RBC Count : 4.23 M/uL  Hemoglobin : 12.3 g/dL  Hematocrit : 39.6 %  Platelet Count - Automated : 119 K/uL  Mean Cell Volume : 93.6 fL  Mean Cell Hemoglobin : 29.1 pg  Mean Cell Hemoglobin Concentration : 31.1 g/dL  Auto Neutrophil # : 3.95 K/uL  Auto Lymphocyte # : 0.71 K/uL  Auto Monocyte # : 0.35 K/uL  Auto Eosinophil # : 0.10 K/uL  Auto Basophil # : 0.02 K/uL  Auto Neutrophil % : 76.2 %  Auto Lymphocyte % : 13.7 %  Auto Monocyte % : 6.8 %  Auto Eosinophil % : 1.9 %  Auto Basophil % : 0.4 %        138  |  106  |  17  ----------------------------<  91  4.3   |  24  |  1.2    Ca    8.8      2020 07:43  Mg     1.9         TPro  7.6  /  Alb  4.3  /  TBili  0.6  /  DBili  x   /  AST  51<H>  /  ALT  23  /  AlkPhos  134<H>      LIVER FUNCTIONS - ( 2020 22:48 )  Alb: 4.3 g/dL / Pro: 7.6 g/dL / ALK PHOS: 134 U/L / ALT: 23 U/L / AST: 51 U/L / GGT: x           PT/INR - ( 2020 22:48 )   PT: 11.70 sec;   INR: 1.02 ratio           Urinalysis Basic - ( 2020 23:30 )    Color: Yellow / Appearance: Clear / S.015 / pH: x  Gluc: x / Ketone: Negative  / Bili: Negative / Urobili: 0.2 mg/dL   Blood: x / Protein: Negative mg/dL / Nitrite: Negative   Leuk Esterase: Moderate / RBC: Negative / WBC 3-5 /HPF   Sq Epi: x / Non Sq Epi: Few /HPF / Bacteria: Few        Assessment:  This is a 59y Female w/ h/o loop recorder placement in past now with syncope vs seizure event and abnormal vEEG suspicious for temporal lobe seizures.    Plan:  1.  Begin levetiracetam 500 mg po bid.  2.  Continue vEEG overnight.    Discussed plan with attending.
CHIEF COMPLAINT:    Patient is a 59y old  Female who presents with a chief complaint of seizures      INTERVAL HPI/OVERNIGHT EVENTS:    Patient seen and examined at bedside. No acute overnight events occurred.    ROS: Reports global headache. All other systems are negative.    Vital Signs:    T(F): 96.3 (07-04-20 @ 04:52), Max: 97.2 (07-03-20 @ 21:10)  HR: 69 (07-04-20 @ 04:52) (57 - 69)  BP: 128/60 (07-04-20 @ 04:52) (121/63 - 128/60)  RR: 16 (07-04-20 @ 04:52) (16 - 16)      PHYSICAL EXAM:  GENERAL:  NAD  SKIN: No rashes or lesions  HEENT: Atraumatic. Normocephalic. Anicteric  NECK:  No JVD.   PULMONARY: Clear to ausculation bilaterally. No wheezing. No rales  CVS: Normal S1, S2. Regular rate and rhythm. No murmurs.  ABDOMEN/GI: Soft, Nontender, Nondistended; Bowel sounds are present  EXTREMITIES:  No edema B/L LE.  NEUROLOGIC:  No motor deficit.  PSYCH: Alert & oriented x 3, normal affect    Consultant(s) Notes Reviewed:  [x ] YES  [ ] NO      LABS:    07-03    138  |  106  |  17  ----------------------------<  91  4.3   |  24  |  1.2    Ca    8.8      03 Jul 2020 07:43  Mg     1.9     07-03          Trop <0.01, CKMB --, CK --, 07-01-20 @ 22:48      Culture - Urine (collected 01 Jul 2020 23:30)  Source: .Urine Catheterized  Final Report (03 Jul 2020 13:05):    <10,000 CFU/mL Normal Urogenital Radha    Culture - Blood (collected 01 Jul 2020 22:55)  Source: .Blood Blood  Preliminary Report (03 Jul 2020 18:01):    No growth to date.    Culture - Blood (collected 01 Jul 2020 22:50)  Source: .Blood Blood  Preliminary Report (03 Jul 2020 18:01):    No growth to date.        RADIOLOGY & ADDITIONAL TESTS:  No new images    Medications:  Standing  aspirin  chewable 81 milliGRAM(s) Oral daily  chlorhexidine 4% Liquid 1 Application(s) Topical <User Schedule>  heparin   Injectable 5000 Unit(s) SubCutaneous every 8 hours  levETIRAcetam 500 milliGRAM(s) Oral two times a day  pantoprazole    Tablet 40 milliGRAM(s) Oral before breakfast  sodium chloride 0.9%. 1000 milliLiter(s) IV Continuous <Continuous>  tacrolimus 2 milliGRAM(s) Oral two times a day  ursodiol Capsule 600 milliGRAM(s) Oral every 12 hours    PRN Meds  acetaminophen   Tablet .. 650 milliGRAM(s) Oral every 6 hours PRN  aluminum hydroxide/magnesium hydroxide/simethicone Suspension 30 milliLiter(s) Oral every 6 hours PRN  ibuprofen  Tablet. 400 milliGRAM(s) Oral every 6 hours PRN  LORazepam   Injectable 2 milliGRAM(s) IV Push once PRN
CHIEF COMPLAINT:    Patient is a 59y old  Female who presents with a chief complaint of syncope/seizure     INTERVAL HPI/OVERNIGHT EVENTS:    Patient seen and examined at bedside. No acute overnight events occurred.    ROS: Denies weakness, blurry vision, HA. All other systems are negative.    Vital Signs:    T(F): 97.2 (07-03-20 @ 05:24), Max: 97.4 (07-02-20 @ 13:56)  HR: 58 (07-03-20 @ 05:24) (58 - 67)  BP: 117/68 (07-03-20 @ 05:24) (111/59 - 117/68)  RR: 16 (07-03-20 @ 05:24) (16 - 16)    PHYSICAL EXAM:  GENERAL:  NAD  SKIN: No rashes or lesions  HEENT: Atraumatic. Normocephalic. Anicteric  NECK:  No JVD.   PULMONARY: Clear to ausculation bilaterally. No wheezing. No rales  CVS: Normal S1, S2. Regular rate and rhythm. No murmurs.  ABDOMEN/GI: Soft, Nontender, Nondistended; Bowel sounds are present  EXTREMITIES:  No edema B/L LE.  NEUROLOGIC:  No motor deficit.  PSYCH: Alert & oriented x 3, normal affect      LABS:                        12.3   5.18  )-----------( 119      ( 01 Jul 2020 22:48 )             39.6     07-03    138  |  106  |  17  ----------------------------<  91  4.3   |  24  |  1.2    Ca    8.8      03 Jul 2020 07:43  Mg     1.9     07-03    TPro  7.6  /  Alb  4.3  /  TBili  0.6  /  DBili  x   /  AST  51<H>  /  ALT  23  /  AlkPhos  134<H>  07-01    PT/INR - ( 01 Jul 2020 22:48 )   PT: 11.70 sec;   INR: 1.02 ratio      Trop <0.01, CKMB --, CK --, 07-01-20 @ 22:48    RADIOLOGY & ADDITIONAL TESTS:  No new images    Medications:  Standing  aspirin  chewable 81 milliGRAM(s) Oral daily  chlorhexidine 4% Liquid 1 Application(s) Topical <User Schedule>  heparin   Injectable 5000 Unit(s) SubCutaneous every 8 hours  pantoprazole    Tablet 40 milliGRAM(s) Oral before breakfast  sodium chloride 0.9%. 1000 milliLiter(s) IV Continuous <Continuous>  tacrolimus 2 milliGRAM(s) Oral two times a day  ursodiol Capsule 600 milliGRAM(s) Oral every 12 hours    PRN Meds  acetaminophen   Tablet .. 650 milliGRAM(s) Oral every 6 hours PRN  aluminum hydroxide/magnesium hydroxide/simethicone Suspension 30 milliLiter(s) Oral every 6 hours PRN  ibuprofen  Tablet. 400 milliGRAM(s) Oral every 6 hours PRN  LORazepam   Injectable 2 milliGRAM(s) IV Push once PRN

## 2020-07-04 NOTE — PROGRESS NOTE PEDS - ASSESSMENT
59 year old female admitted for work up altered mental status episode. VEEG diagnostic of left temporal lobe seizures that resolved after initiation of Keppra.    Clear from Epilepsy standpoint to discharge home today  Continue current dose Keppra  Follow up with Neurology in 3-4 weeks

## 2020-07-04 NOTE — DISCHARGE NOTE PROVIDER - CARE PROVIDERS DIRECT ADDRESSES
[email or web address]

## 2020-07-04 NOTE — PROGRESS NOTE ADULT - ASSESSMENT
58 yo F hx of CVA 2018 no deficits, loop recorder (placed 2 years ago) PBC s/p liver transplant in 2006 on prograf presents with seizure    Complex partial seizure left temporal.  - VEEG showed Complex partial seizure left temporal.  - spoke with neuro yesterday: Keppra started and VEEG continued.   - f/u VEEG results and neuro recs for today    Headache  - likely due to hair being pulled tightly and electrodes     PBC s/p liver transplant  - c/w ursodiol  - c/w prograf (level=5 which is therapeutic)    Loop recorder  - had it placed 2 years ago   - no event found    DVt px  Full Code  From home    #Progress Note Handoff:  Pending (specify):  neuro follow up  Family discussion: discussed EEG findings  Disposition: Home_x__/SNF___/Other________/Unknown at this time________ 60 yo F hx of CVA 2018 no deficits, loop recorder (placed 2 years ago) PBC s/p liver transplant in 2006 on prograf presents with seizure    Complex partial seizure left temporal.  - VEEG showed Complex partial seizure left temporal.  - spoke with neuro yesterday: Keppra started and VEEG continued.   - f/u VEEG results and neuro recs for today    Headache  - likely due to hair being pulled tightly and electrodes     CKD III  - stable    PBC s/p liver transplant  - c/w ursodiol  - c/w prograf (level=5 which is therapeutic)    Loop recorder  - had it placed 2 years ago   - no event found    DVt px  Full Code  From home    #Progress Note Handoff:  Pending (specify):  neuro follow up  Family discussion: discussed EEG findings  Disposition: Home_x__/SNF___/Other________/Unknown at this time________

## 2020-07-04 NOTE — DISCHARGE NOTE PROVIDER - HOSPITAL COURSE
58 yo F hx of CVA 2018 no deficits, loop recorder (placed 2 years ago) PBC s/p liver transplant in 2006 on prograf presented for seizure activity. On VEEG pt was found to have Complex partial seizure left temporal. She was started on Keppra and monitored for another 24 hours

## 2020-07-04 NOTE — DISCHARGE NOTE PROVIDER - NSDCMRMEDTOKEN_GEN_ALL_CORE_FT
aspirin 81 mg oral tablet, chewable: 1 tab(s) orally once a day  magnesium oxide 400 mg (241.3 mg elemental magnesium) oral tablet: 1 tab(s) orally once a day  pantoprazole 40 mg oral delayed release tablet: 1 tab(s) orally once a day  tacrolimus 1 mg oral capsule: 2 cap(s) orally every 12 hours  ursodiol 500 mg oral tablet: 1 tab(s) orally 2 times a day aspirin 81 mg oral tablet, chewable: 1 tab(s) orally once a day  levETIRAcetam 500 mg oral tablet: 1 tab(s) orally 2 times a day  magnesium oxide 400 mg (241.3 mg elemental magnesium) oral tablet: 1 tab(s) orally once a day  pantoprazole 40 mg oral delayed release tablet: 1 tab(s) orally once a day  tacrolimus 1 mg oral capsule: 2 cap(s) orally every 12 hours  ursodiol 500 mg oral tablet: 1 tab(s) orally 2 times a day

## 2020-07-04 NOTE — PROGRESS NOTE PEDS - SUBJECTIVE AND OBJECTIVE BOX
[id number]  GOLDY LUCAS  59y    Female    Allergies: iodine (Unknown)  shellfish (Unknown)      Medications: acetaminophen   Tablet .. 650 milliGRAM(s) Oral every 6 hours PRN  aluminum hydroxide/magnesium hydroxide/simethicone Suspension 30 milliLiter(s) Oral every 6 hours PRN  aspirin  chewable 81 milliGRAM(s) Oral daily  chlorhexidine 4% Liquid 1 Application(s) Topical <User Schedule>  heparin   Injectable 5000 Unit(s) SubCutaneous every 8 hours  ibuprofen  Tablet. 400 milliGRAM(s) Oral every 6 hours PRN  levETIRAcetam 500 milliGRAM(s) Oral two times a day  LORazepam   Injectable 2 milliGRAM(s) IV Push once PRN  pantoprazole    Tablet 40 milliGRAM(s) Oral before breakfast  sodium chloride 0.9%. 1000 milliLiter(s) IV Continuous <Continuous>  tacrolimus 2 milliGRAM(s) Oral two times a day  ursodiol Capsule 600 milliGRAM(s) Oral every 12 hours      T(C): 35.7 (07-04-20 @ 04:52), Max: 36.2 (07-03-20 @ 21:10)  HR: 69 (07-04-20 @ 04:52) (57 - 69)  BP: 128/60 (07-04-20 @ 04:52) (121/63 - 128/60)  RR: 16 (07-04-20 @ 04:52) (16 - 16)    Patient did well with administration of Keppra. No reported events. I reviewed EEG findings from yesterday with her and she does not recall feeling any differently during episode. Her neighbor in room, however, interjected than she witnessed patient suddenly stop communicating and looking off to side on two occasions.    VEEG showed well organized background. No slowing or epileptiform activity. No electrographic or clinical seizures.    PHYSICAL EXAM:    Awake and conversive. In NAD.    Neurological: CN II-XII in tact. No nystagmus. Motor full strength x4. Normal tone. Ambulates without assistance

## 2020-07-04 NOTE — DISCHARGE NOTE PROVIDER - NSDCCPCAREPLAN_GEN_ALL_CORE_FT
PRINCIPAL DISCHARGE DIAGNOSIS  Diagnosis: Seizure  Assessment and Plan of Treatment: Please take keppra as prescribed. Avoid driving, staying out in the heat, alcohol until neurology follow up

## 2020-07-04 NOTE — DISCHARGE NOTE NURSING/CASE MANAGEMENT/SOCIAL WORK - PATIENT PORTAL LINK FT
You can access the FollowMyHealth Patient Portal offered by [facility] by registering at the following website: [email or web address]. By joining [employer]’s FollowMyHealth portal, you will also be able to view your health information using other applications (apps) compatible with our system.
You can access the FollowMyHealth Patient Portal offered by [facility] by registering at the following website: [email or web address]. By joining [employer]’s FollowMyHealth portal, you will also be able to view your health information using other applications (apps) compatible with our system.

## 2020-07-04 NOTE — DISCHARGE NOTE PROVIDER - PROVIDER TOKENS
PROVIDER:[TOKEN:[[id number]],FOLLOWUP:[1 week]] PROVIDER:[TOKEN:[[id number]],FOLLOWUP:[1 week]]

## 2020-07-04 NOTE — DISCHARGE NOTE PROVIDER - CARE PROVIDER_API CALL
Gerardo Villarreal A  NEUROLOGY  [address] [place] [id number]  Phone: [phone]  Fax: [phone]  Follow Up Time: 1 week Lachelle Dhaliwal  NEUROLOGY  [address] [place] [address]  Phone: [phone]  Fax: [phone]  Follow Up Time: 1 week

## 2020-07-07 DIAGNOSIS — E87.1 HYPO-OSMOLALITY AND HYPONATREMIA: ICD-10-CM

## 2020-07-07 DIAGNOSIS — Z86.73 PERSONAL HISTORY OF TRANSIENT ISCHEMIC ATTACK (TIA), AND CEREBRAL INFARCTION WITHOUT RESIDUAL DEFICITS: ICD-10-CM

## 2020-07-07 DIAGNOSIS — G40.209 LOCALIZATION-RELATED (FOCAL) (PARTIAL) SYMPTOMATIC EPILEPSY AND EPILEPTIC SYNDROMES WITH COMPLEX PARTIAL SEIZURES, NOT INTRACTABLE, WITHOUT STATUS EPILEPTICUS: ICD-10-CM

## 2020-07-07 DIAGNOSIS — Z94.4 LIVER TRANSPLANT STATUS: ICD-10-CM

## 2020-07-07 DIAGNOSIS — Z95.0 PRESENCE OF CARDIAC PACEMAKER: ICD-10-CM

## 2020-07-07 DIAGNOSIS — R51 HEADACHE: ICD-10-CM

## 2020-07-07 DIAGNOSIS — N18.3 CHRONIC KIDNEY DISEASE, STAGE 3 (MODERATE): ICD-10-CM

## 2020-07-07 LAB
CULTURE RESULTS: SIGNIFICANT CHANGE UP
CULTURE RESULTS: SIGNIFICANT CHANGE UP
SPECIMEN SOURCE: SIGNIFICANT CHANGE UP
SPECIMEN SOURCE: SIGNIFICANT CHANGE UP

## 2020-07-09 ENCOUNTER — APPOINTMENT (OUTPATIENT)
Dept: NEUROLOGY | Facility: CLINIC | Age: 60
End: 2020-07-09
Payer: MEDICAID

## 2020-07-09 VITALS
BODY MASS INDEX: 28.47 KG/M2 | DIASTOLIC BLOOD PRESSURE: 60 MMHG | OXYGEN SATURATION: 99 % | HEIGHT: 60 IN | WEIGHT: 145 LBS | HEART RATE: 69 BPM | TEMPERATURE: 96.3 F | SYSTOLIC BLOOD PRESSURE: 120 MMHG

## 2020-07-09 DIAGNOSIS — R56.9 UNSPECIFIED CONVULSIONS: ICD-10-CM

## 2020-07-09 PROCEDURE — 99215 OFFICE O/P EST HI 40 MIN: CPT

## 2020-07-14 NOTE — ASSESSMENT
[FreeTextEntry1] : Patient is a 59 year old woman with history of primary biliary cholangitis, liver transplant 2006, s/p loop recorder 2018 who initially presented  to [facility] 7/2/20 for evaluation of seizure like event.  Patient had VEEG at [facility] 7/4/20 that showed complex partial seizures of left temporal region.   \par \par Plan:\par 1. B/W for levetiracetam level, CBC, CMP, Thyroid. \par 2  Continue Levetiracetam 500 mg BID\par 3. Follow up in 2 months

## 2020-07-14 NOTE — HISTORY OF PRESENT ILLNESS
[FreeTextEntry1] : Patient is a 59 year old woman with history of primary biliary cholangitis, liver transplant 2006, s/p loop recorder 2018 who initially presented  to [facility] 7/2/20 for evaluation of seizure like event.  Patient was admitted for VEEG at [facility] 7/4/20 and had abnormal VEEG which showed complex partial seizures of left temporal region. Patient had a CT Head 7/2020 which was normal.  \par \par Patient was admitted to [facility] in March for dysarthria in the setting of low magnesium.  Patient had a  MRI Head and MRA during that admission in March 2020 that was normal.\par \par Patient states day of seizure event on 7/2/20 she had a headache all day.  She was lying on the couch when her daughter in law noted her making a snoring sound.  She fell from the couch and had convulsive\par activity x 30 sec. She states she was confused after the event and until EMS arrived.  Patient denies tongue biting and incontinence.  Patient denies history of seizures.  \par \par Patient reports she has not had any events suggestive of seizure since starting Keppra.  However, patient c/o of forgetful since starting Keppra.

## 2020-08-03 RX ORDER — LEVETIRACETAM 500 MG/1
500 TABLET, FILM COATED ORAL TWICE DAILY
Qty: 60 | Refills: 2 | Status: ACTIVE | COMMUNITY
Start: 2020-08-03 | End: 1900-01-01

## 2020-08-07 ENCOUNTER — APPOINTMENT (OUTPATIENT)
Dept: NEUROLOGY | Facility: CLINIC | Age: 60
End: 2020-08-07
Payer: MEDICAID

## 2020-08-07 PROCEDURE — 95816 EEG AWAKE AND DROWSY: CPT

## 2020-09-03 ENCOUNTER — APPOINTMENT (OUTPATIENT)
Dept: NEUROLOGY | Facility: CLINIC | Age: 60
End: 2020-09-03

## 2020-09-08 ENCOUNTER — APPOINTMENT (OUTPATIENT)
Dept: NEUROLOGY | Facility: CLINIC | Age: 60
End: 2020-09-08

## 2020-11-11 ENCOUNTER — INPATIENT (INPATIENT)
Facility: HOSPITAL | Age: 60
LOS: 5 days | Discharge: HOME | End: 2020-11-17
Attending: HOSPITALIST | Admitting: HOSPITALIST
Payer: MEDICAID

## 2020-11-11 VITALS
RESPIRATION RATE: 18 BRPM | WEIGHT: 149.91 LBS | HEIGHT: 60 IN | DIASTOLIC BLOOD PRESSURE: 63 MMHG | OXYGEN SATURATION: 97 % | TEMPERATURE: 96 F | SYSTOLIC BLOOD PRESSURE: 136 MMHG | HEART RATE: 74 BPM

## 2020-11-11 DIAGNOSIS — Z98.890 OTHER SPECIFIED POSTPROCEDURAL STATES: Chronic | ICD-10-CM

## 2020-11-11 DIAGNOSIS — Z94.4 LIVER TRANSPLANT STATUS: Chronic | ICD-10-CM

## 2020-11-11 LAB
ALBUMIN SERPL ELPH-MCNC: 4 G/DL — SIGNIFICANT CHANGE UP (ref 3.5–5.2)
ALP SERPL-CCNC: 135 U/L — HIGH (ref 30–115)
ALT FLD-CCNC: 18 U/L — SIGNIFICANT CHANGE UP (ref 0–41)
ANION GAP SERPL CALC-SCNC: 11 MMOL/L — SIGNIFICANT CHANGE UP (ref 7–14)
AST SERPL-CCNC: 31 U/L — SIGNIFICANT CHANGE UP (ref 0–41)
BASOPHILS # BLD AUTO: 0.03 K/UL — SIGNIFICANT CHANGE UP (ref 0–0.2)
BASOPHILS NFR BLD AUTO: 0.6 % — SIGNIFICANT CHANGE UP (ref 0–1)
BILIRUB SERPL-MCNC: 0.4 MG/DL — SIGNIFICANT CHANGE UP (ref 0.2–1.2)
BUN SERPL-MCNC: 21 MG/DL — HIGH (ref 10–20)
CALCIUM SERPL-MCNC: 9.2 MG/DL — SIGNIFICANT CHANGE UP (ref 8.5–10.1)
CHLORIDE SERPL-SCNC: 106 MMOL/L — SIGNIFICANT CHANGE UP (ref 98–110)
CO2 SERPL-SCNC: 21 MMOL/L — SIGNIFICANT CHANGE UP (ref 17–32)
CREAT SERPL-MCNC: 1.3 MG/DL — SIGNIFICANT CHANGE UP (ref 0.7–1.5)
EOSINOPHIL # BLD AUTO: 0.19 K/UL — SIGNIFICANT CHANGE UP (ref 0–0.7)
EOSINOPHIL NFR BLD AUTO: 3.6 % — SIGNIFICANT CHANGE UP (ref 0–8)
GLUCOSE SERPL-MCNC: 120 MG/DL — HIGH (ref 70–99)
HCT VFR BLD CALC: 36.7 % — LOW (ref 37–47)
HGB BLD-MCNC: 11.4 G/DL — LOW (ref 12–16)
IMM GRANULOCYTES NFR BLD AUTO: 0.4 % — HIGH (ref 0.1–0.3)
LACTATE SERPL-SCNC: 1 MMOL/L — SIGNIFICANT CHANGE UP (ref 0.7–2)
LYMPHOCYTES # BLD AUTO: 1.1 K/UL — LOW (ref 1.2–3.4)
LYMPHOCYTES # BLD AUTO: 21.1 % — SIGNIFICANT CHANGE UP (ref 20.5–51.1)
MCHC RBC-ENTMCNC: 29.8 PG — SIGNIFICANT CHANGE UP (ref 27–31)
MCHC RBC-ENTMCNC: 31.1 G/DL — LOW (ref 32–37)
MCV RBC AUTO: 96.1 FL — SIGNIFICANT CHANGE UP (ref 81–99)
MONOCYTES # BLD AUTO: 0.61 K/UL — HIGH (ref 0.1–0.6)
MONOCYTES NFR BLD AUTO: 11.7 % — HIGH (ref 1.7–9.3)
NEUTROPHILS # BLD AUTO: 3.26 K/UL — SIGNIFICANT CHANGE UP (ref 1.4–6.5)
NEUTROPHILS NFR BLD AUTO: 62.6 % — SIGNIFICANT CHANGE UP (ref 42.2–75.2)
NRBC # BLD: 0 /100 WBCS — SIGNIFICANT CHANGE UP (ref 0–0)
PLATELET # BLD AUTO: 135 K/UL — SIGNIFICANT CHANGE UP (ref 130–400)
POTASSIUM SERPL-MCNC: 4.6 MMOL/L — SIGNIFICANT CHANGE UP (ref 3.5–5)
POTASSIUM SERPL-SCNC: 4.6 MMOL/L — SIGNIFICANT CHANGE UP (ref 3.5–5)
PROT SERPL-MCNC: 7.1 G/DL — SIGNIFICANT CHANGE UP (ref 6–8)
RAPID RVP RESULT: SIGNIFICANT CHANGE UP
RBC # BLD: 3.82 M/UL — LOW (ref 4.2–5.4)
RBC # FLD: 14.6 % — HIGH (ref 11.5–14.5)
SARS-COV-2 RNA SPEC QL NAA+PROBE: SIGNIFICANT CHANGE UP
SODIUM SERPL-SCNC: 138 MMOL/L — SIGNIFICANT CHANGE UP (ref 135–146)
TROPONIN T SERPL-MCNC: <0.01 NG/ML — SIGNIFICANT CHANGE UP
WBC # BLD: 5.21 K/UL — SIGNIFICANT CHANGE UP (ref 4.8–10.8)
WBC # FLD AUTO: 5.21 K/UL — SIGNIFICANT CHANGE UP (ref 4.8–10.8)

## 2020-11-11 PROCEDURE — 70450 CT HEAD/BRAIN W/O DYE: CPT | Mod: 26

## 2020-11-11 PROCEDURE — 99223 1ST HOSP IP/OBS HIGH 75: CPT

## 2020-11-11 PROCEDURE — 99497 ADVNCD CARE PLAN 30 MIN: CPT | Mod: 25

## 2020-11-11 PROCEDURE — 99285 EMERGENCY DEPT VISIT HI MDM: CPT

## 2020-11-11 RX ORDER — PANTOPRAZOLE SODIUM 20 MG/1
40 TABLET, DELAYED RELEASE ORAL
Refills: 0 | Status: DISCONTINUED | OUTPATIENT
Start: 2020-11-11 | End: 2020-11-17

## 2020-11-11 RX ORDER — PANTOPRAZOLE SODIUM 20 MG/1
1 TABLET, DELAYED RELEASE ORAL
Qty: 0 | Refills: 0 | DISCHARGE

## 2020-11-11 RX ORDER — TACROLIMUS 5 MG/1
2 CAPSULE ORAL
Qty: 0 | Refills: 0 | DISCHARGE

## 2020-11-11 RX ORDER — INFLUENZA VIRUS VACCINE 15; 15; 15; 15 UG/.5ML; UG/.5ML; UG/.5ML; UG/.5ML
0.5 SUSPENSION INTRAMUSCULAR ONCE
Refills: 0 | Status: COMPLETED | OUTPATIENT
Start: 2020-11-11 | End: 2020-11-11

## 2020-11-11 RX ORDER — CHLORHEXIDINE GLUCONATE 213 G/1000ML
1 SOLUTION TOPICAL
Refills: 0 | Status: DISCONTINUED | OUTPATIENT
Start: 2020-11-11 | End: 2020-11-17

## 2020-11-11 RX ORDER — HEPARIN SODIUM 5000 [USP'U]/ML
5000 INJECTION INTRAVENOUS; SUBCUTANEOUS EVERY 12 HOURS
Refills: 0 | Status: DISCONTINUED | OUTPATIENT
Start: 2020-11-11 | End: 2020-11-17

## 2020-11-11 RX ORDER — URSODIOL 250 MG/1
500 TABLET, FILM COATED ORAL
Refills: 0 | Status: DISCONTINUED | OUTPATIENT
Start: 2020-11-11 | End: 2020-11-11

## 2020-11-11 RX ORDER — TACROLIMUS 5 MG/1
2 CAPSULE ORAL
Refills: 0 | Status: DISCONTINUED | OUTPATIENT
Start: 2020-11-11 | End: 2020-11-17

## 2020-11-11 RX ORDER — ATORVASTATIN CALCIUM 80 MG/1
80 TABLET, FILM COATED ORAL AT BEDTIME
Refills: 0 | Status: DISCONTINUED | OUTPATIENT
Start: 2020-11-11 | End: 2020-11-17

## 2020-11-11 RX ORDER — ASPIRIN/CALCIUM CARB/MAGNESIUM 324 MG
325 TABLET ORAL ONCE
Refills: 0 | Status: COMPLETED | OUTPATIENT
Start: 2020-11-11 | End: 2020-11-11

## 2020-11-11 RX ORDER — URSODIOL 250 MG/1
300 TABLET, FILM COATED ORAL EVERY 12 HOURS
Refills: 0 | Status: DISCONTINUED | OUTPATIENT
Start: 2020-11-11 | End: 2020-11-17

## 2020-11-11 RX ORDER — ATORVASTATIN CALCIUM 80 MG/1
80 TABLET, FILM COATED ORAL ONCE
Refills: 0 | Status: COMPLETED | OUTPATIENT
Start: 2020-11-11 | End: 2020-11-11

## 2020-11-11 RX ORDER — ASPIRIN/CALCIUM CARB/MAGNESIUM 324 MG
81 TABLET ORAL DAILY
Refills: 0 | Status: DISCONTINUED | OUTPATIENT
Start: 2020-11-11 | End: 2020-11-17

## 2020-11-11 RX ORDER — URSODIOL 250 MG/1
1 TABLET, FILM COATED ORAL
Qty: 0 | Refills: 0 | DISCHARGE

## 2020-11-11 RX ORDER — CHOLECALCIFEROL (VITAMIN D3) 125 MCG
2000 CAPSULE ORAL DAILY
Refills: 0 | Status: DISCONTINUED | OUTPATIENT
Start: 2020-11-11 | End: 2020-11-17

## 2020-11-11 RX ADMIN — ATORVASTATIN CALCIUM 80 MILLIGRAM(S): 80 TABLET, FILM COATED ORAL at 16:40

## 2020-11-11 RX ADMIN — TACROLIMUS 2 MILLIGRAM(S): 5 CAPSULE ORAL at 21:02

## 2020-11-11 RX ADMIN — PANTOPRAZOLE SODIUM 40 MILLIGRAM(S): 20 TABLET, DELAYED RELEASE ORAL at 21:02

## 2020-11-11 RX ADMIN — Medication 325 MILLIGRAM(S): at 16:40

## 2020-11-11 RX ADMIN — URSODIOL 300 MILLIGRAM(S): 250 TABLET, FILM COATED ORAL at 21:02

## 2020-11-11 RX ADMIN — HEPARIN SODIUM 5000 UNIT(S): 5000 INJECTION INTRAVENOUS; SUBCUTANEOUS at 21:01

## 2020-11-11 NOTE — ED PROVIDER NOTE - PROGRESS NOTE DETAILS
Pt informed of ct findings, awaiting neuro c/s. Pt at this time still NIH 0 without any sxs. normal speech, no facial asymmetry, no pronator drift, normal gait. no complaints at this time. dr. carreno aware of pt and ct results, req lrt admission, high dose asa and lipitor, will assess antiepileptic

## 2020-11-11 NOTE — H&P ADULT - NSHPSOCIALHISTORY_GEN_ALL_CORE
Tobacco use: Exsmoker, quit 14 years ago, h/o 1 pack x 20 year  EtOH use: Denies   Illicit drug use: Denies

## 2020-11-11 NOTE — ED ADULT NURSE NOTE - CHIEF COMPLAINT QUOTE
pt coming from home with [facility]- EMS states that pt's tongue is swollen and can't swallow symptoms began "around 11 oclock this morning" pt states she has a history of seizures but did not have one today.

## 2020-11-11 NOTE — ED PROVIDER NOTE - ATTENDING CONTRIBUTION TO CARE
I was present for and supervised the key and critical aspects of the procedures performed during the care of the patient. patient is a 58 yo f who is s/p liver transplant secondary to cholangitis presents today with left sided facial twitching and swelling starting at 11 am prior to arrival at this time all symptoms have resolved she denies any headache, visual changes, chest pain, sob she denies any other weakness, numbness or tingling  on physical exam the patient is nc/at perrla eomi oropharynx clear cta b/l, rrr s1s2 noted abd-soft nt nd bs+ ext from.    neuro, the patient has full strength, full sensation she is able to move all extremities equally   we obtained ct head, labs ekg and will consult neuro I will continue to monitor at this time

## 2020-11-11 NOTE — H&P ADULT - ATTENDING COMMENTS
#L facial twitch/ droop  occurred this am, lasting 10 minutes, unable to form seal after drinking coffee  cth with focal hypoattenuation R temporal  f/u neuro  will likely need mri  pt off keppra since august due to intolerance, will need to f/u neuro; noted to have complex partial sz 7/2020

## 2020-11-11 NOTE — ED PROVIDER NOTE - CLINICAL SUMMARY MEDICAL DECISION MAKING FREE TEXT BOX
patient presents for evaluation of left sided patient presents for evaluation of left sided facial twitching that is completely resolved at this time we have obtained ct head, labs and have consulted neurology I will admit for further management at this time

## 2020-11-11 NOTE — ED ADULT TRIAGE NOTE - CHIEF COMPLAINT QUOTE
pt coming from home with [facility]- EMS states that pt's tongue is swollen and can't swallow symptoms began "around 11 oclock this morning" pt states she has a history of seizures but did not have one today.

## 2020-11-11 NOTE — ED PROVIDER NOTE - OBJECTIVE STATEMENT
patient is a 60 yo f who is s/p liver transplant secondary to cholangitis presents today with left sided facial twitching and swelling starting at 11 am prior to arrival at this time all symptoms have resolved she denies any headache, visual changes, chest pain, sob she denies any other weakness, numbness or tingling

## 2020-11-11 NOTE — H&P ADULT - NSHPLABSRESULTS_GEN_ALL_CORE
11.4   5.21  )-----------( 135      ( 11 Nov 2020 12:15 )             36.7       11-11    138  |  106  |  21<H>  ----------------------------<  120<H>  4.6   |  21  |  1.3    Ca    9.2      11 Nov 2020 12:15    TPro  7.1  /  Alb  4.0  /  TBili  0.4  /  DBili  x   /  AST  31  /  ALT  18  /  AlkPhos  135<H>  11-11                          Lactate Trend  11-11 @ 12:15 Lactate:1.0       CARDIAC MARKERS ( 11 Nov 2020 12:15 )  x     / <0.01 ng/mL / x     / x     / x                  CT Head No Cont (11.11.20 @ 14:02)   IMPRESSION:  In comparison to the previous head CT dated 7/2/2020:  New area of hypoattenuation within the anterior/lateral right temporal lobe. Recommend further evaluation with a contrast-enhanced brain MRI.

## 2020-11-11 NOTE — H&P ADULT - NSHPPHYSICALEXAM_GEN_ALL_CORE
VITALS: Vital Signs Last 24 Hrs  T(C): 36 (11 Nov 2020 15:56), Max: 36 (11 Nov 2020 15:56)  T(F): 96.8 (11 Nov 2020 15:56), Max: 96.8 (11 Nov 2020 15:56)  HR: 63 (11 Nov 2020 15:56) (63 - 74)  BP: 118/56 (11 Nov 2020 15:56) (118/56 - 136/63)  RR: 18 (11 Nov 2020 15:56) (18 - 18)  SpO2: 99% (11 Nov 2020 15:56) (97% - 99%)    GENERAL: NAD, lying in bed comfortably  HEAD:  Atraumatic, Normocephalic  EYES: EOMI, PERRLA, conjunctiva and sclera clear  ENT: Moist mucous membranes  NECK: Supple, No JVD  CHEST/LUNG: Clear to auscultation bilaterally. Unlabored respirations  HEART: Regular rate and rhythm; No murmurs, rubs, or gallops  ABDOMEN: Bowel sounds present; Soft, Nontender, Nondistended.   EXTREMITIES:  2+ Peripheral Pulses, brisk capillary refill. No edema. +Varicose veins   NERVOUS SYSTEM:  Alert & Oriented X3, speech clear. No pronator drift. Finger to nose WNL. Sensory motor intact. Smile/Frawn/Wrinkling of forehead WNL   MSK: FROM all 4 extremities, full and equal strength  SKIN: +Varicose veins b/l

## 2020-11-11 NOTE — ED ADULT NURSE NOTE - SUICIDE SCREENING QUESTION 1
No
50 yo woman w/ ha, sensory deficit, aphasia. Most likely complex migraine, though symptoms are also concerning for stroke. Stroke code called. Will manage in cooperation w/ neurology. Will require admission.

## 2020-11-11 NOTE — H&P ADULT - ASSESSMENT
Patient is a 59 year old Female with a past medical history of CVA 2018 without any deficits, Primary biliary cirrhosis s/p Liver transplant 2006, 1 episode of seizure in 7/2020, presents to the ER with facial twitching x this am, CT Head revealing "New area of hypoattenuation within the anterior/lateral right temporal lobe". Patient admitted to medicine for further neuro workup.     Plan:   # New area of hypoattenuation Right temporal lobe vs. seizures   - Neurology consult   - Will order MRI if neurology agrees   - Continue with Aspirin 81   - Will add Lipitor   - Patient declines Keppra   - seizure precautions   - Ativan PRN     # GERD   - Continue with Protonix     # Vitamin D deficiency   - Continue with supplements     # Primary Biliary Cirrhosis   - As per pharmacy, Ursodiol 500 is non-formulary  and we only carries Ursodiol 300   - Will ask the patient if she could bring her own medications.     Discussed the above case and plan with the attending

## 2020-11-11 NOTE — GOALS OF CARE CONVERSATION - ADVANCED CARE PLANNING - CONVERSATION DETAILS
D/w pt at bedside. She has seizure disorder, cth concerning for stroke. She has no living will, she is full code.

## 2020-11-11 NOTE — H&P ADULT - HISTORY OF PRESENT ILLNESS
Patient is a 59 year old Female with a past medical history of CVA 2018 without any deficits, Primary biliary cirrhosis s/p Liver transplant 2006, 1 episode of seizure in 7/2020, presents to the ER with facial twitching x this am. As per patient, she woke up this morning and noticed left side oral twitching, when she attempted to drink coffee, it dribbled over her left side of the face as she lost left facial senses. Incident was noticed by her daughter in law, who told her that she talked "funny" and her left face was swollen. When EMS arrived to the scene the facial swelling has improved and EMS gave oxygen. Patient was admitted to [facility] in 07/2020 and had a VEEG, which revealed complex partial seizures on the Left temporal and was started on Keppra. Patient took the medication for 1 month and abruptly stopped it by herself as the Keppra caused her slow gait, unsteadiness, memory loss, stomach irritations and she felt "like a zombie". Patient currently denies headaches, fevers, chills, nausea, vomiting, chest pain, SOB, palpitations, abdominal pain, dysuria, constipation, new onset of leg swelling. Patient denies any trauma, drug abuse, ETOH abuse, endocrine d/o, recent infections, recent travel, tumors, bowel/bladder incontinence, fall.

## 2020-11-12 ENCOUNTER — APPOINTMENT (OUTPATIENT)
Dept: NEUROLOGY | Facility: CLINIC | Age: 60
End: 2020-11-12

## 2020-11-12 LAB
ALBUMIN SERPL ELPH-MCNC: 3.6 G/DL — SIGNIFICANT CHANGE UP (ref 3.5–5.2)
ALP SERPL-CCNC: 124 U/L — HIGH (ref 30–115)
ALT FLD-CCNC: 15 U/L — SIGNIFICANT CHANGE UP (ref 0–41)
ANION GAP SERPL CALC-SCNC: 9 MMOL/L — SIGNIFICANT CHANGE UP (ref 7–14)
AST SERPL-CCNC: 26 U/L — SIGNIFICANT CHANGE UP (ref 0–41)
BASOPHILS # BLD AUTO: 0.03 K/UL — SIGNIFICANT CHANGE UP (ref 0–0.2)
BASOPHILS NFR BLD AUTO: 0.7 % — SIGNIFICANT CHANGE UP (ref 0–1)
BILIRUB SERPL-MCNC: 0.5 MG/DL — SIGNIFICANT CHANGE UP (ref 0.2–1.2)
BUN SERPL-MCNC: 20 MG/DL — SIGNIFICANT CHANGE UP (ref 10–20)
CALCIUM SERPL-MCNC: 8.8 MG/DL — SIGNIFICANT CHANGE UP (ref 8.5–10.1)
CHLORIDE SERPL-SCNC: 108 MMOL/L — SIGNIFICANT CHANGE UP (ref 98–110)
CO2 SERPL-SCNC: 24 MMOL/L — SIGNIFICANT CHANGE UP (ref 17–32)
CREAT SERPL-MCNC: 1.2 MG/DL — SIGNIFICANT CHANGE UP (ref 0.7–1.5)
EOSINOPHIL # BLD AUTO: 0.19 K/UL — SIGNIFICANT CHANGE UP (ref 0–0.7)
EOSINOPHIL NFR BLD AUTO: 4.6 % — SIGNIFICANT CHANGE UP (ref 0–8)
GLUCOSE SERPL-MCNC: 91 MG/DL — SIGNIFICANT CHANGE UP (ref 70–99)
HCT VFR BLD CALC: 35 % — LOW (ref 37–47)
HGB BLD-MCNC: 10.9 G/DL — LOW (ref 12–16)
IMM GRANULOCYTES NFR BLD AUTO: 0.2 % — SIGNIFICANT CHANGE UP (ref 0.1–0.3)
LYMPHOCYTES # BLD AUTO: 0.97 K/UL — LOW (ref 1.2–3.4)
LYMPHOCYTES # BLD AUTO: 23.5 % — SIGNIFICANT CHANGE UP (ref 20.5–51.1)
MAGNESIUM SERPL-MCNC: 1.4 MG/DL — LOW (ref 1.8–2.4)
MCHC RBC-ENTMCNC: 29.8 PG — SIGNIFICANT CHANGE UP (ref 27–31)
MCHC RBC-ENTMCNC: 31.1 G/DL — LOW (ref 32–37)
MCV RBC AUTO: 95.6 FL — SIGNIFICANT CHANGE UP (ref 81–99)
MONOCYTES # BLD AUTO: 0.51 K/UL — SIGNIFICANT CHANGE UP (ref 0.1–0.6)
MONOCYTES NFR BLD AUTO: 12.3 % — HIGH (ref 1.7–9.3)
NEUTROPHILS # BLD AUTO: 2.42 K/UL — SIGNIFICANT CHANGE UP (ref 1.4–6.5)
NEUTROPHILS NFR BLD AUTO: 58.7 % — SIGNIFICANT CHANGE UP (ref 42.2–75.2)
NRBC # BLD: 0 /100 WBCS — SIGNIFICANT CHANGE UP (ref 0–0)
PLATELET # BLD AUTO: 131 K/UL — SIGNIFICANT CHANGE UP (ref 130–400)
POTASSIUM SERPL-MCNC: 5.1 MMOL/L — HIGH (ref 3.5–5)
POTASSIUM SERPL-SCNC: 5.1 MMOL/L — HIGH (ref 3.5–5)
PROT SERPL-MCNC: 6.7 G/DL — SIGNIFICANT CHANGE UP (ref 6–8)
RBC # BLD: 3.66 M/UL — LOW (ref 4.2–5.4)
RBC # FLD: 14.6 % — HIGH (ref 11.5–14.5)
SARS-COV-2 IGG SERPL QL IA: NEGATIVE — SIGNIFICANT CHANGE UP
SARS-COV-2 IGM SERPL IA-ACNC: 0.1 INDEX — SIGNIFICANT CHANGE UP
SODIUM SERPL-SCNC: 141 MMOL/L — SIGNIFICANT CHANGE UP (ref 135–146)
TACROLIMUS SERPL-MCNC: 19.9 NG/ML — SIGNIFICANT CHANGE UP
WBC # BLD: 4.13 K/UL — LOW (ref 4.8–10.8)
WBC # FLD AUTO: 4.13 K/UL — LOW (ref 4.8–10.8)

## 2020-11-12 PROCEDURE — 99233 SBSQ HOSP IP/OBS HIGH 50: CPT

## 2020-11-12 PROCEDURE — 70551 MRI BRAIN STEM W/O DYE: CPT | Mod: 26

## 2020-11-12 PROCEDURE — 99222 1ST HOSP IP/OBS MODERATE 55: CPT

## 2020-11-12 RX ORDER — MAGNESIUM SULFATE 500 MG/ML
2 VIAL (ML) INJECTION
Refills: 0 | Status: COMPLETED | OUTPATIENT
Start: 2020-11-12 | End: 2020-11-12

## 2020-11-12 RX ADMIN — HEPARIN SODIUM 5000 UNIT(S): 5000 INJECTION INTRAVENOUS; SUBCUTANEOUS at 18:06

## 2020-11-12 RX ADMIN — URSODIOL 300 MILLIGRAM(S): 250 TABLET, FILM COATED ORAL at 05:24

## 2020-11-12 RX ADMIN — Medication 1 TABLET(S): at 13:03

## 2020-11-12 RX ADMIN — TACROLIMUS 2 MILLIGRAM(S): 5 CAPSULE ORAL at 18:06

## 2020-11-12 RX ADMIN — HEPARIN SODIUM 5000 UNIT(S): 5000 INJECTION INTRAVENOUS; SUBCUTANEOUS at 05:27

## 2020-11-12 RX ADMIN — PANTOPRAZOLE SODIUM 40 MILLIGRAM(S): 20 TABLET, DELAYED RELEASE ORAL at 21:45

## 2020-11-12 RX ADMIN — Medication 50 GRAM(S): at 11:25

## 2020-11-12 RX ADMIN — Medication 2000 UNIT(S): at 13:03

## 2020-11-12 RX ADMIN — Medication 81 MILLIGRAM(S): at 13:03

## 2020-11-12 RX ADMIN — ATORVASTATIN CALCIUM 80 MILLIGRAM(S): 80 TABLET, FILM COATED ORAL at 21:45

## 2020-11-12 RX ADMIN — Medication 50 GRAM(S): at 13:02

## 2020-11-12 RX ADMIN — TACROLIMUS 2 MILLIGRAM(S): 5 CAPSULE ORAL at 05:23

## 2020-11-12 RX ADMIN — URSODIOL 300 MILLIGRAM(S): 250 TABLET, FILM COATED ORAL at 18:06

## 2020-11-12 NOTE — CONSULT NOTE ADULT - SUBJECTIVE AND OBJECTIVE BOX
Neurology Consultation note    Name  GOLDY LUCAS    HPI:  Patient is a 59 year old Female with a past medical history of CVA 2018 without any deficits, Primary biliary cirrhosis s/p Liver transplant 2006, 1 episode of seizure in 7/2020, presents to the ER with facial twitching x this am. As per patient, she woke up this morning and noticed left side oral twitching, when she attempted to drink coffee, it dribbled over her left side of the face as she lost left facial senses. Incident was noticed by her daughter in law, who told her that she talked "funny" and her left face was swollen. When EMS arrived to the scene the facial swelling has improved and EMS gave oxygen. Patient was admitted to [facility] in 07/2020 and had a VEEG, which revealed complex partial seizures on the Left temporal and was started on Keppra. Patient took the medication for 1 month and abruptly stopped it by herself as the Keppra caused her slow gait, unsteadiness, memory loss, stomach irritations and she felt "like a zombie". Patient currently denies headaches, fevers, chills, nausea, vomiting, chest pain, SOB, palpitations, abdominal pain, dysuria, constipation, new onset of leg swelling. Patient denies any trauma, drug abuse, ETOH abuse, endocrine d/o, recent infections, recent travel, tumors, bowel/bladder incontinence, fall.  (11 Nov 2020 17:54)      NEURO:  58 YO FEMALE KNOWN TO ME WITH CVA IN THE PAST AND SZ D/O P/W L FACIAL NUMBNESS AND TWITCHING SIMILAR TO WHAT SHE EXPERIENCED IN [place] PAST WITH HER SZ. SHE WAS SUPPOSED TO BE ON KEPPRA BUT SHE SELF DC BC SHE DID NOT LIKE THE WAY IT MADE HER FEEL.  EEG WAS + FOR EPILEPTIFORM ACTIVITY ON LAST ADM  AT BASELINE NOW      Vital Signs Last 24 Hrs  T(C): 35.8 (12 Nov 2020 05:00), Max: 36 (11 Nov 2020 15:56)  T(F): 96.4 (12 Nov 2020 05:00), Max: 96.8 (11 Nov 2020 15:56)  HR: 85 (12 Nov 2020 05:00) (63 - 85)  BP: 133/64 (12 Nov 2020 05:00) (118/56 - 136/63)  BP(mean): --  RR: 18 (12 Nov 2020 05:00) (18 - 18)  SpO2: 99% (11 Nov 2020 15:56) (97% - 99%)    Neurological Exam:   Mental status: Awake, alert and oriented x3.  Recent and remote memory intact.  Naming, repetition and comprehension intact.  Attention/concentration intact.  No dysarthria, no aphasia.  Fund of knowledge appropriate.    Cranial nerves: Pupils equally round and reactive to light, visual fields full, no nystagmus, extraocular muscles intact, V1 through V3 intact bilaterally and symmetric, face symmetric, hearing intact to finger rub, palate elevation symmetric, tongue was midline.  Motor:  MRC grading 5/5 b/l UE/LE.   strength 5/5.  Normal tone and bulk.  No abnormal movements.    Sensation: Intact to light touch, proprioception, and pinprick.   Coordination: No dysmetria on finger-to-nose and heel-to-shin.  No dysdiadokinesia.  Reflexes: 2+ in bilateral UE/LE, downgoing toes bilaterally. (-) Hearn.  Gait: Narrow and steady. No ataxia.  Romberg negative    Medications  aspirin  chewable 81 milliGRAM(s) Oral daily  atorvastatin 80 milliGRAM(s) Oral at bedtime  chlorhexidine 4% Liquid 1 Application(s) Topical <User Schedule>  cholecalciferol 2000 Unit(s) Oral daily  heparin   Injectable 5000 Unit(s) SubCutaneous every 12 hours  influenza   Vaccine 0.5 milliLiter(s) IntraMuscular once  magnesium sulfate  IVPB 2 Gram(s) IV Intermittent every 2 hours  multivitamin 1 Tablet(s) Oral daily  pantoprazole    Tablet 40 milliGRAM(s) Oral before breakfast  tacrolimus 2 milliGRAM(s) Oral two times a day  ursodiol Capsule 300 milliGRAM(s) Oral every 12 hours      Lab  11-12    141  |  108  |  20  ----------------------------<  91  5.1<H>   |  24  |  1.2    Ca    8.8      12 Nov 2020 06:45  Mg     1.4     11-12    TPro  6.7  /  Alb  3.6  /  TBili  0.5  /  DBili  x   /  AST  26  /  ALT  15  /  AlkPhos  124<H>  11-12                          10.9   4.13  )-----------( 131      ( 12 Nov 2020 06:45 )             35.0     LIVER FUNCTIONS - ( 12 Nov 2020 06:45 )  Alb: 3.6 g/dL / Pro: 6.7 g/dL / ALK PHOS: 124 U/L / ALT: 15 U/L / AST: 26 U/L / GGT: x             1.4        Radiology  CT Head No Cont:   EXAM:  CT BRAIN            PROCEDURE DATE:  11/11/2020            INTERPRETATION:  Clinical History / Reason for exam: Seizure versus near syncope.    Technique: Noncontrast head CT.  Contiguous unenhanced CT axial images of the head from the base to the vertex with coronal and sagittal reformats.    Comparison: CT head 7/2/2020, MRI brain 3/5/2020    Findings:    The ventricles and cortical sulci are within normal limits for age.    There is area of hypoattenuation within the anterior/lateral right temporal lobe, new since the prior exam.    There is no acute intracranial hemorrhage, extra-axial fluid collection or midline shift.    There is calcific atherosclerotic disease at the skull base.    The visualized paranasal sinuses and mastoids are well aerated.    IMPRESSION:  In comparison to the previous head CT dated 7/2/2020:    New area of hypoattenuation within the anterior/lateral right temporal lobe. Recommend further evaluation with a contrast-enhanced brain MRI.              HUSSAIN VALLE MD; Attending Radiologist  This document has been electronically signed. Nov 11 2020  2:12PM (11-11-20 @ 14:02)      Assessment:  58 YO FEMALE WITH HX OF CVA AND COMPLEX PARTIAL SZ ADM WITH SZ LIKE ACTIVITY/AURA  PT SELF DC HER KEPPRA AND IS NOT ON AED  NORMAL EXAM AT PRESENT  CTH WITH NEW AREA OF HYPODENSITY AS ABOVE  Plan:  REEG  MRI BRAIN NC  PENDING EEG WOULD START TEGRETOL 200 MG Q 12. PATIENT IS AGREEABLE TO TRYING A NEW AED

## 2020-11-12 NOTE — PROGRESS NOTE ADULT - ASSESSMENT
59F PMHx CVA 2018, PBC s/p liver transplant 2006, seizure disorder 7/2020 here with L facial twitch/ droop. CTH with new focal hypoattenuation R temporal.    #L facial twitch/ droop  occurred this am, lasting 10 minutes, unable to form seal after drinking coffee  cth with focal hypoattenuation R temporal  mri head  reeg  appreciate neuro  pt self d/c keppra  #CVA  asa  lipitor 80  #Primary biliary cirrhosis  tacrolimus 2 bid  check level  urosdiol 300 bid  #DVT ppx  subq hep    #Progress Note Handoff:  Pending (specify):  Consults_________, Tests________, Test Results_______, Other____mri_____  Family discussion:d/w pt at bedside re: treatment plan, primary dx  Disposition: Home_x__/SNF___/Other________/Unknown at this time________ all other ROS negative except as per HPI

## 2020-11-12 NOTE — PROGRESS NOTE ADULT - SUBJECTIVE AND OBJECTIVE BOX
INTERVAL HPI/OVERNIGHT EVENTS:    SUBJECTIVE: Patient seen and examined at bedside.     no cp, sob, abd pain, fever  no syncope, lightheadedness, dizziness, ha    OBJECTIVE:    VITAL SIGNS:  Vital Signs Last 24 Hrs  T(C): 35.8 (12 Nov 2020 05:00), Max: 36 (11 Nov 2020 15:56)  T(F): 96.4 (12 Nov 2020 05:00), Max: 96.8 (11 Nov 2020 15:56)  HR: 85 (12 Nov 2020 05:00) (63 - 85)  BP: 133/64 (12 Nov 2020 05:00) (118/56 - 136/63)  BP(mean): --  RR: 18 (12 Nov 2020 05:00) (18 - 18)  SpO2: 99% (11 Nov 2020 15:56) (97% - 99%)      PHYSICAL EXAM:    General: NAD  HEENT: NC/AT; PERRL, clear conjunctiva  Neck: supple  Respiratory: CTA b/l  Cardiovascular: +S1/S2; RRR  Abdomen: soft, NT/ND; +BS x4  Extremities: WWP, 2+ peripheral pulses b/l; no LE edema  Skin: normal color and turgor; no rash  Neurological:    MEDICATIONS:  MEDICATIONS  (STANDING):  aspirin  chewable 81 milliGRAM(s) Oral daily  atorvastatin 80 milliGRAM(s) Oral at bedtime  chlorhexidine 4% Liquid 1 Application(s) Topical <User Schedule>  cholecalciferol 2000 Unit(s) Oral daily  heparin   Injectable 5000 Unit(s) SubCutaneous every 12 hours  influenza   Vaccine 0.5 milliLiter(s) IntraMuscular once  magnesium sulfate  IVPB 2 Gram(s) IV Intermittent every 2 hours  multivitamin 1 Tablet(s) Oral daily  pantoprazole    Tablet 40 milliGRAM(s) Oral before breakfast  tacrolimus 2 milliGRAM(s) Oral two times a day  ursodiol Capsule 300 milliGRAM(s) Oral every 12 hours    MEDICATIONS  (PRN):      ALLERGIES:  Allergies    iodine (Unknown)  shellfish (Unknown)    Intolerances        LABS:                        10.9   4.13  )-----------( 131      ( 12 Nov 2020 06:45 )             35.0     Hemoglobin: 10.9 g/dL (11-12 @ 06:45)  Hemoglobin: 11.4 g/dL (11-11 @ 12:15)    CBC Full  -  ( 12 Nov 2020 06:45 )  WBC Count : 4.13 K/uL  RBC Count : 3.66 M/uL  Hemoglobin : 10.9 g/dL  Hematocrit : 35.0 %  Platelet Count - Automated : 131 K/uL  Mean Cell Volume : 95.6 fL  Mean Cell Hemoglobin : 29.8 pg  Mean Cell Hemoglobin Concentration : 31.1 g/dL  Auto Neutrophil # : 2.42 K/uL  Auto Lymphocyte # : 0.97 K/uL  Auto Monocyte # : 0.51 K/uL  Auto Eosinophil # : 0.19 K/uL  Auto Basophil # : 0.03 K/uL  Auto Neutrophil % : 58.7 %  Auto Lymphocyte % : 23.5 %  Auto Monocyte % : 12.3 %  Auto Eosinophil % : 4.6 %  Auto Basophil % : 0.7 %    11-12    141  |  108  |  20  ----------------------------<  91  5.1<H>   |  24  |  1.2    Ca    8.8      12 Nov 2020 06:45  Mg     1.4     11-12    TPro  6.7  /  Alb  3.6  /  TBili  0.5  /  DBili  x   /  AST  26  /  ALT  15  /  AlkPhos  124<H>  11-12    Creatinine Trend: 1.2<--, 1.3<--  LIVER FUNCTIONS - ( 12 Nov 2020 06:45 )  Alb: 3.6 g/dL / Pro: 6.7 g/dL / ALK PHOS: 124 U/L / ALT: 15 U/L / AST: 26 U/L / GGT: x               hs Troponin:              CSF:                      EKG:   MICROBIOLOGY:    IMAGING:      Labs, imaging, EKG personally reviewed    RADIOLOGY & ADDITIONAL TESTS: Reviewed.

## 2020-11-13 ENCOUNTER — TRANSCRIPTION ENCOUNTER (OUTPATIENT)
Age: 60
End: 2020-11-13

## 2020-11-13 LAB
ANION GAP SERPL CALC-SCNC: 9 MMOL/L — SIGNIFICANT CHANGE UP (ref 7–14)
APTT BLD: 32.9 SEC — SIGNIFICANT CHANGE UP (ref 27–39.2)
BUN SERPL-MCNC: 22 MG/DL — HIGH (ref 10–20)
CALCIUM SERPL-MCNC: 9.1 MG/DL — SIGNIFICANT CHANGE UP (ref 8.5–10.1)
CHLORIDE SERPL-SCNC: 105 MMOL/L — SIGNIFICANT CHANGE UP (ref 98–110)
CO2 SERPL-SCNC: 25 MMOL/L — SIGNIFICANT CHANGE UP (ref 17–32)
CREAT SERPL-MCNC: 1.2 MG/DL — SIGNIFICANT CHANGE UP (ref 0.7–1.5)
GLUCOSE SERPL-MCNC: 96 MG/DL — SIGNIFICANT CHANGE UP (ref 70–99)
HCT VFR BLD CALC: 37.3 % — SIGNIFICANT CHANGE UP (ref 37–47)
HGB BLD-MCNC: 11.7 G/DL — LOW (ref 12–16)
INR BLD: 1.08 RATIO — SIGNIFICANT CHANGE UP (ref 0.65–1.3)
MAGNESIUM SERPL-MCNC: 2 MG/DL — SIGNIFICANT CHANGE UP (ref 1.8–2.4)
MCHC RBC-ENTMCNC: 30.2 PG — SIGNIFICANT CHANGE UP (ref 27–31)
MCHC RBC-ENTMCNC: 31.4 G/DL — LOW (ref 32–37)
MCV RBC AUTO: 96.4 FL — SIGNIFICANT CHANGE UP (ref 81–99)
NRBC # BLD: 0 /100 WBCS — SIGNIFICANT CHANGE UP (ref 0–0)
PHOSPHATE SERPL-MCNC: 4.3 MG/DL — SIGNIFICANT CHANGE UP (ref 2.1–4.9)
PLATELET # BLD AUTO: 159 K/UL — SIGNIFICANT CHANGE UP (ref 130–400)
POTASSIUM SERPL-MCNC: 5 MMOL/L — SIGNIFICANT CHANGE UP (ref 3.5–5)
POTASSIUM SERPL-SCNC: 5 MMOL/L — SIGNIFICANT CHANGE UP (ref 3.5–5)
PROTHROM AB SERPL-ACNC: 12.4 SEC — SIGNIFICANT CHANGE UP (ref 9.95–12.87)
RAPID RVP RESULT: SIGNIFICANT CHANGE UP
RBC # BLD: 3.87 M/UL — LOW (ref 4.2–5.4)
RBC # FLD: 14.6 % — HIGH (ref 11.5–14.5)
SARS-COV-2 RNA SPEC QL NAA+PROBE: SIGNIFICANT CHANGE UP
SODIUM SERPL-SCNC: 139 MMOL/L — SIGNIFICANT CHANGE UP (ref 135–146)
WBC # BLD: 4.88 K/UL — SIGNIFICANT CHANGE UP (ref 4.8–10.8)
WBC # FLD AUTO: 4.88 K/UL — SIGNIFICANT CHANGE UP (ref 4.8–10.8)

## 2020-11-13 PROCEDURE — 99233 SBSQ HOSP IP/OBS HIGH 50: CPT

## 2020-11-13 PROCEDURE — 95816 EEG AWAKE AND DROWSY: CPT | Mod: 26

## 2020-11-13 PROCEDURE — 99232 SBSQ HOSP IP/OBS MODERATE 35: CPT

## 2020-11-13 RX ORDER — CHOLECALCIFEROL (VITAMIN D3) 125 MCG
1 CAPSULE ORAL
Qty: 0 | Refills: 0 | DISCHARGE

## 2020-11-13 RX ORDER — CALCIUM CARBONATE 500(1250)
1 TABLET ORAL
Refills: 0 | Status: DISCONTINUED | OUTPATIENT
Start: 2020-11-13 | End: 2020-11-17

## 2020-11-13 RX ORDER — ATORVASTATIN CALCIUM 80 MG/1
1 TABLET, FILM COATED ORAL
Qty: 0 | Refills: 0 | DISCHARGE
Start: 2020-11-13

## 2020-11-13 RX ADMIN — HEPARIN SODIUM 5000 UNIT(S): 5000 INJECTION INTRAVENOUS; SUBCUTANEOUS at 06:13

## 2020-11-13 RX ADMIN — URSODIOL 300 MILLIGRAM(S): 250 TABLET, FILM COATED ORAL at 06:13

## 2020-11-13 RX ADMIN — ATORVASTATIN CALCIUM 80 MILLIGRAM(S): 80 TABLET, FILM COATED ORAL at 21:30

## 2020-11-13 RX ADMIN — Medication 1 TABLET(S): at 12:35

## 2020-11-13 RX ADMIN — Medication 2000 UNIT(S): at 12:35

## 2020-11-13 RX ADMIN — PANTOPRAZOLE SODIUM 40 MILLIGRAM(S): 20 TABLET, DELAYED RELEASE ORAL at 21:30

## 2020-11-13 RX ADMIN — CHLORHEXIDINE GLUCONATE 1 APPLICATION(S): 213 SOLUTION TOPICAL at 06:12

## 2020-11-13 RX ADMIN — TACROLIMUS 2 MILLIGRAM(S): 5 CAPSULE ORAL at 06:13

## 2020-11-13 RX ADMIN — Medication 81 MILLIGRAM(S): at 12:35

## 2020-11-13 NOTE — DISCHARGE NOTE PROVIDER - HOSPITAL COURSE
59F PMHx CVA 2018, PBC s/p liver transplant 2006, seizure disorder 7/2020 here with L facial twitch/ droop. Occured on am of 11/11; lasted 10 minutes, unable to form seal after drinking coffee  Underwent CTH  in ED demonstrating new focal hypoattenuation R temporal. Seen by neuro, had mri without contrast, with focal signal abnormality, will need contrast to further delineate. D/w pt liver team at [place], decision made to transfer to [facility]. She is also pending REEG, had planned to start tegretol 200 bid pending results.    31 minutes spent on discharge planning. 59F PMHx CVA 2018, PBC s/p liver transplant 2006, seizure disorder 7/2020 here with L facial twitch/ droop. Occured on am of 11/11; lasted 10 minutes, unable to form seal after drinking coffee  Underwent CTH  in ED demonstrating new focal hypoattenuation R temporal. Seen by neuro, had mri without contrast, with focal signal abnormality, will need contrast to further delineate. D/w pt liver team at [place], decision made to transfer to [facility]. She is also pending REEG, had planned to start tegretol 200 bid pending results.    Pt pending transfer to [facility], has been prolonged due to financial/ auth. Pt remained assymptomatic without further episodes of facial twitch, syncope, lightheadedness. D/w pt and neuro, decision made to d/c pt home, she will need to f/u with her transplant doctors at [place] within the week. She will need hepatology, pmd, neuro f/u one week.    31 minutes spent on discharge planning.

## 2020-11-13 NOTE — PROGRESS NOTE ADULT - SUBJECTIVE AND OBJECTIVE BOX
INTERVAL HPI/OVERNIGHT EVENTS:    SUBJECTIVE: Patient seen and examined at bedside.     no cp, sob, abd pain, fever  no syncope, ha, lightheadedness, dizziness    OBJECTIVE:    VITAL SIGNS:  Vital Signs Last 24 Hrs  T(C): 35.8 (13 Nov 2020 06:33), Max: 36.3 (12 Nov 2020 14:27)  T(F): 96.5 (13 Nov 2020 06:33), Max: 97.4 (12 Nov 2020 14:27)  HR: 61 (13 Nov 2020 06:33) (61 - 73)  BP: 115/56 (13 Nov 2020 06:33) (114/59 - 143/59)  BP(mean): --  RR: 16 (13 Nov 2020 06:33) (16 - 16)  SpO2: 93% (12 Nov 2020 14:17) (93% - 93%)      PHYSICAL EXAM:    General: NAD  HEENT: NC/AT; PERRL, clear conjunctiva  Neck: supple  Respiratory: CTA b/l  Cardiovascular: +S1/S2; RRR  Abdomen: soft, NT/ND; +BS x4  Extremities: WWP, 2+ peripheral pulses b/l; no LE edema  Skin: normal color and turgor; no rash  Neurological:    MEDICATIONS:  MEDICATIONS  (STANDING):  aspirin  chewable 81 milliGRAM(s) Oral daily  atorvastatin 80 milliGRAM(s) Oral at bedtime  chlorhexidine 4% Liquid 1 Application(s) Topical <User Schedule>  cholecalciferol 2000 Unit(s) Oral daily  heparin   Injectable 5000 Unit(s) SubCutaneous every 12 hours  influenza   Vaccine 0.5 milliLiter(s) IntraMuscular once  multivitamin 1 Tablet(s) Oral daily  pantoprazole    Tablet 40 milliGRAM(s) Oral before breakfast  tacrolimus 2 milliGRAM(s) Oral two times a day  ursodiol Capsule 300 milliGRAM(s) Oral every 12 hours    MEDICATIONS  (PRN):  calcium carbonate   1250 mG (OsCal) 1 Tablet(s) Oral two times a day PRN Heartburn      ALLERGIES:  Allergies    iodine (Unknown)  shellfish (Unknown)    Intolerances        LABS:                        11.7   4.88  )-----------( 159      ( 13 Nov 2020 06:23 )             37.3     Hemoglobin: 11.7 g/dL (11-13 @ 06:23)  Hemoglobin: 10.9 g/dL (11-12 @ 06:45)  Hemoglobin: 11.4 g/dL (11-11 @ 12:15)    CBC Full  -  ( 13 Nov 2020 06:23 )  WBC Count : 4.88 K/uL  RBC Count : 3.87 M/uL  Hemoglobin : 11.7 g/dL  Hematocrit : 37.3 %  Platelet Count - Automated : 159 K/uL  Mean Cell Volume : 96.4 fL  Mean Cell Hemoglobin : 30.2 pg  Mean Cell Hemoglobin Concentration : 31.4 g/dL  Auto Neutrophil # : x  Auto Lymphocyte # : x  Auto Monocyte # : x  Auto Eosinophil # : x  Auto Basophil # : x  Auto Neutrophil % : x  Auto Lymphocyte % : x  Auto Monocyte % : x  Auto Eosinophil % : x  Auto Basophil % : x    11-13    139  |  105  |  22<H>  ----------------------------<  96  5.0   |  25  |  1.2    Ca    9.1      13 Nov 2020 06:23  Phos  4.3     11-13  Mg     2.0     11-13    TPro  6.7  /  Alb  3.6  /  TBili  0.5  /  DBili  x   /  AST  26  /  ALT  15  /  AlkPhos  124<H>  11-12    Creatinine Trend: 1.2<--, 1.2<--, 1.3<--  LIVER FUNCTIONS - ( 12 Nov 2020 06:45 )  Alb: 3.6 g/dL / Pro: 6.7 g/dL / ALK PHOS: 124 U/L / ALT: 15 U/L / AST: 26 U/L / GGT: x           PT/INR - ( 13 Nov 2020 06:23 )   PT: 12.40 sec;   INR: 1.08 ratio         PTT - ( 13 Nov 2020 06:23 )  PTT:32.9 sec    hs Troponin:              CSF:                      EKG:   MICROBIOLOGY:    IMAGING:      Labs, imaging, EKG personally reviewed    RADIOLOGY & ADDITIONAL TESTS: Reviewed.

## 2020-11-13 NOTE — DISCHARGE NOTE PROVIDER - PROVIDER TOKENS
PROVIDER:[TOKEN:[[id number]]] PROVIDER:[TOKEN:[[id number]]],PROVIDER:[TOKEN:[[id number]]],PROVIDER:[TOKEN:[[id number]]]

## 2020-11-13 NOTE — PROGRESS NOTE ADULT - ASSESSMENT
59F PMHx CVA 2018, PBC s/p liver transplant 2006, seizure disorder 7/2020 here with L facial twitch/ droop. CTH with new focal hypoattenuation R temporal.    #L facial twitch/ droop  lasted 10 minutes, unable to form seal after drinking coffee  cth with focal hypoattenuation R temporal  mri head noted, repeat mri head with contrast  reeg  appreciate neuro  to start tegretol post reeg  #CVA  asa  lipitor 80  #Primary biliary cirrhosis  tacrolimus 2 bid  check level  urosdiol 300 bid  #DVT ppx  subq hep    #Progress Note Handoff:  Pending (specify):  Consults_________, Tests________, Test Results_______, Other____mri_____  Family discussion:d/w pt at bedside re: treatment plan, primary dx  Disposition: Home_x__/SNF___/Other________/Unknown at this time________

## 2020-11-13 NOTE — DISCHARGE NOTE PROVIDER - CARE PROVIDERS DIRECT ADDRESSES
,[email or web address],DirectAddress_Unknown,DirectAddress_Unknown

## 2020-11-13 NOTE — DISCHARGE NOTE NURSING/CASE MANAGEMENT/SOCIAL WORK - PATIENT PORTAL LINK FT
You can access the FollowMyHealth Patient Portal offered by [facility] by registering at the following website: [email or web address]. By joining [facility]’s FollowMyHealth portal, you will also be able to view your health information using other applications (apps) compatible with our system.

## 2020-11-13 NOTE — DISCHARGE NOTE PROVIDER - CARE PROVIDER_API CALL
Lachelle Dhaliwal  NEUROLOGY  [address] [place] [address], [address]  [place] [address]  Phone: [phone]  Fax: [phone]  Follow Up Time:    Lachelle Dhaliwal  NEUROLOGY  [address] [facility], [address]  [place] [address]  Phone: [phone]  Fax: [phone]  Follow Up Time:     DASH LAZCANO  [id number]  [address] [facility] [address]  [place] [address]  Phone: ([phone]  Fax: ([phone]  Follow Up Time:     MARIA DEL ROSARIO HANNA  [id number] [address] [place] [address]  Phone: ([phone]  Fax: ()-  Follow Up Time:

## 2020-11-13 NOTE — PROGRESS NOTE ADULT - SUBJECTIVE AND OBJECTIVE BOX
Neurology Follow up note    Name  GOLDY LUCAS    HPI:  Patient is a 59 year old Female with a past medical history of CVA 2018 without any deficits, Primary biliary cirrhosis s/p Liver transplant 2006, 1 episode of seizure in 7/2020, presents to the ER with facial twitching x this am. As per patient, she woke up this morning and noticed left side oral twitching, when she attempted to drink coffee, it dribbled over her left side of the face as she lost left facial senses. Incident was noticed by her daughter in law, who told her that she talked "funny" and her left face was swollen. When EMS arrived to the scene the facial swelling has improved and EMS gave oxygen. Patient was admitted to [facility] in 07/2020 and had a VEEG, which revealed complex partial seizures on the Left temporal and was started on Keppra. Patient took the medication for 1 month and abruptly stopped it by herself as the Keppra caused her slow gait, unsteadiness, memory loss, stomach irritations and she felt "like a zombie". Patient currently denies headaches, fevers, chills, nausea, vomiting, chest pain, SOB, palpitations, abdominal pain, dysuria, constipation, new onset of leg swelling. Patient denies any trauma, drug abuse, ETOH abuse, endocrine d/o, recent infections, recent travel, tumors, bowel/bladder incontinence, fall.  (11 Nov 2020 17:54)      Interval History:    patient feels well  no events overnight  mri brain:  New abnormal white matter signal abnormality involving the right lateral temporal lobe as well as punctate focus of signal abnormality involving the left middle frontal gyrus in the cortical region. Findings are nonspecific and may represent infectious/inflammatory etiology though neoplasm cannot be entirely excluded. Postcontrast examination is recommended for further evaluation.        Vital Signs Last 24 Hrs  T(C): 35.8 (13 Nov 2020 06:33), Max: 36.3 (12 Nov 2020 14:27)  T(F): 96.5 (13 Nov 2020 06:33), Max: 97.4 (12 Nov 2020 14:27)  HR: 61 (13 Nov 2020 06:33) (61 - 73)  BP: 115/56 (13 Nov 2020 06:33) (114/59 - 143/59)  BP(mean): --  RR: 16 (13 Nov 2020 06:33) (16 - 16)  SpO2: 93% (12 Nov 2020 14:17) (93% - 93%)    Neurological Exam:   Mental status: Awake, alert and oriented x3.  Recent and remote memory intact.  Naming, repetition and comprehension intact.  Attention/concentration intact.  No dysarthria, no aphasia.  Fund of knowledge appropriate.    Cranial nerves: Pupils equally round and reactive to light, visual fields full, no nystagmus, extraocular muscles intact, V1 through V3 intact bilaterally and symmetric, face symmetric, hearing intact to finger rub, palate elevation symmetric, tongue was midline.  Motor:  MRC grading 5/5 b/l UE/LE.   strength 5/5.  Normal tone and bulk.  No abnormal movements.    Sensation: Intact to light touch, proprioception, and pinprick.   Coordination: No dysmetria on finger-to-nose and heel-to-shin.  No dysdiadokinesia.  Reflexes: 2+ in bilateral UE/LE, downgoing toes bilaterally. (-) Hearn.  Gait: Narrow and steady. No ataxia.  Romberg negative    Medications  aspirin  chewable 81 milliGRAM(s) Oral daily  atorvastatin 80 milliGRAM(s) Oral at bedtime  chlorhexidine 4% Liquid 1 Application(s) Topical <User Schedule>  cholecalciferol 2000 Unit(s) Oral daily  heparin   Injectable 5000 Unit(s) SubCutaneous every 12 hours  influenza   Vaccine 0.5 milliLiter(s) IntraMuscular once  multivitamin 1 Tablet(s) Oral daily  pantoprazole    Tablet 40 milliGRAM(s) Oral before breakfast  tacrolimus 2 milliGRAM(s) Oral two times a day  ursodiol Capsule 300 milliGRAM(s) Oral every 12 hours      Lab  11-13    139  |  105  |  22<H>  ----------------------------<  96  5.0   |  25  |  1.2    Ca    9.1      13 Nov 2020 06:23  Phos  4.3     11-13  Mg     2.0     11-13    TPro  6.7  /  Alb  3.6  /  TBili  0.5  /  DBili  x   /  AST  26  /  ALT  15  /  AlkPhos  124<H>  11-12                          11.7   4.88  )-----------( 159      ( 13 Nov 2020 06:23 )             37.3     LIVER FUNCTIONS - ( 12 Nov 2020 06:45 )  Alb: 3.6 g/dL / Pro: 6.7 g/dL / ALK PHOS: 124 U/L / ALT: 15 U/L / AST: 26 U/L / GGT: x             2.0        Radiology      Assessment:  58 yo female with hx of cva and complex partial sz adm with a likely cp sz in setting of self d/c her keppra due to side effects  mri brain shows new area of abnormality in the r temporal lobe s/ restricted diffusion - unclear etiology    Plan:  repeat mri brain with gado  eeg pending for today  would start tegretol 200 mg q 12. discussed with pt and she is agreeable      Neurology Follow up note    Name  GOLDY LUCAS    HPI:  Patient is a 59 year old Female with a past medical history of CVA 2018 without any deficits, Primary biliary cirrhosis s/p Liver transplant 2006, 1 episode of seizure in 7/2020, presents to the ER with facial twitching x this am. As per patient, she woke up this morning and noticed left side oral twitching, when she attempted to drink coffee, it dribbled over her left side of the face as she lost left facial senses. Incident was noticed by her daughter in law, who told her that she talked "funny" and her left face was swollen. When EMS arrived to the scene the facial swelling has improved and EMS gave oxygen. Patient was admitted to [facility] in 07/2020 and had a VEEG, which revealed complex partial seizures on the Left temporal and was started on Keppra. Patient took the medication for 1 month and abruptly stopped it by herself as the Keppra caused her slow gait, unsteadiness, memory loss, stomach irritations and she felt "like a zombie". Patient currently denies headaches, fevers, chills, nausea, vomiting, chest pain, SOB, palpitations, abdominal pain, dysuria, constipation, new onset of leg swelling. Patient denies any trauma, drug abuse, ETOH abuse, endocrine d/o, recent infections, recent travel, tumors, bowel/bladder incontinence, fall.  (11 Nov 2020 17:54)      Interval History:    patient feels well  no events overnight  mri brain:  New abnormal white matter signal abnormality involving the right lateral temporal lobe as well as punctate focus of signal abnormality involving the left middle frontal gyrus in the cortical region. Findings are nonspecific and may represent infectious/inflammatory etiology though neoplasm cannot be entirely excluded. Postcontrast examination is recommended for further evaluation.        Vital Signs Last 24 Hrs  T(C): 35.8 (13 Nov 2020 06:33), Max: 36.3 (12 Nov 2020 14:27)  T(F): 96.5 (13 Nov 2020 06:33), Max: 97.4 (12 Nov 2020 14:27)  HR: 61 (13 Nov 2020 06:33) (61 - 73)  BP: 115/56 (13 Nov 2020 06:33) (114/59 - 143/59)  BP(mean): --  RR: 16 (13 Nov 2020 06:33) (16 - 16)  SpO2: 93% (12 Nov 2020 14:17) (93% - 93%)    Neurological Exam:   Mental status: Awake, alert and oriented x3.  Recent and remote memory intact.  Naming, repetition and comprehension intact.  Attention/concentration intact.  No dysarthria, no aphasia.  Fund of knowledge appropriate.    Cranial nerves: Pupils equally round and reactive to light, visual fields full, no nystagmus, extraocular muscles intact, V1 through V3 intact bilaterally and symmetric, face symmetric, hearing intact to finger rub, palate elevation symmetric, tongue was midline.  Motor:  MRC grading 5/5 b/l UE/LE.   strength 5/5.  Normal tone and bulk.  No abnormal movements.    Sensation: Intact to light touch, proprioception, and pinprick.   Coordination: No dysmetria on finger-to-nose and heel-to-shin.  No dysdiadokinesia.  Reflexes: 2+ in bilateral UE/LE, downgoing toes bilaterally. (-) Hearn.  Gait: Narrow and steady. No ataxia.  Romberg negative    Medications  aspirin  chewable 81 milliGRAM(s) Oral daily  atorvastatin 80 milliGRAM(s) Oral at bedtime  chlorhexidine 4% Liquid 1 Application(s) Topical <User Schedule>  cholecalciferol 2000 Unit(s) Oral daily  heparin   Injectable 5000 Unit(s) SubCutaneous every 12 hours  influenza   Vaccine 0.5 milliLiter(s) IntraMuscular once  multivitamin 1 Tablet(s) Oral daily  pantoprazole    Tablet 40 milliGRAM(s) Oral before breakfast  tacrolimus 2 milliGRAM(s) Oral two times a day  ursodiol Capsule 300 milliGRAM(s) Oral every 12 hours      Lab  11-13    139  |  105  |  22<H>  ----------------------------<  96  5.0   |  25  |  1.2    Ca    9.1      13 Nov 2020 06:23  Phos  4.3     11-13  Mg     2.0     11-13    TPro  6.7  /  Alb  3.6  /  TBili  0.5  /  DBili  x   /  AST  26  /  ALT  15  /  AlkPhos  124<H>  11-12                          11.7   4.88  )-----------( 159      ( 13 Nov 2020 06:23 )             37.3     LIVER FUNCTIONS - ( 12 Nov 2020 06:45 )  Alb: 3.6 g/dL / Pro: 6.7 g/dL / ALK PHOS: 124 U/L / ALT: 15 U/L / AST: 26 U/L / GGT: x             2.0        Radiology      Assessment:  58 yo female with hx of cva and complex partial sz, liver transplant, primary biliary sclerosis adm with a likely cp sz in setting of self d/c her keppra due to side effects  mri brain shows new area of abnormality in the r temporal lobe s/ restricted diffusion - unclear etiology    Plan:  repeat mri brain with gado  eeg pending for today  would start tegretol 200 mg q 12 if there is no medical contraindication. discussed with pt and she is agreeable

## 2020-11-13 NOTE — CHART NOTE - NSCHARTNOTEFT_GEN_A_CORE
Pt requested to be transferred to [place] for further treatment and evaluation.  During sign out a new COVID swab was requested.  [facility] STAT was approved by Dr. Reed.   Pt was swabbed and specimen dropped at the lab at 1725.    Also head MRI NC results were discussed with pt at length.  All questions answered to pt satisfaction.

## 2020-11-13 NOTE — DISCHARGE NOTE PROVIDER - NSDCCPCAREPLAN_GEN_ALL_CORE_FT
PRINCIPAL DISCHARGE DIAGNOSIS  Diagnosis: Facial twitching  Assessment and Plan of Treatment: YOU WILL BE TRANSFERRED TO [facility]      SECONDARY DISCHARGE DIAGNOSES  Diagnosis: Seizure-like activity  Assessment and Plan of Treatment:      PRINCIPAL DISCHARGE DIAGNOSIS  Diagnosis: Facial twitching  Assessment and Plan of Treatment: PLEASE FOLLOW UP WITH YOUR HEPATOLOGY, PRIMARY CARE DOCTOR, NEUROLOGIST IN ONE WEEK.      SECONDARY DISCHARGE DIAGNOSES  Diagnosis: Seizure-like activity  Assessment and Plan of Treatment:

## 2020-11-13 NOTE — DISCHARGE NOTE PROVIDER - NSDCMRMEDTOKEN_GEN_ALL_CORE_FT
aspirin 81 mg oral tablet, chewable: 1 tab(s) orally once a day  atorvastatin 80 mg oral tablet: 1 tab(s) orally once a day (at bedtime)  pantoprazole 40 mg oral delayed release tablet: 1 tab(s) orally once a day  Prograf 1 mg oral capsule: 2 milligram(s) orally 2 times a day  ursodiol 500 mg oral tablet: 1 tab(s) orally 2 times a day

## 2020-11-14 PROCEDURE — 99233 SBSQ HOSP IP/OBS HIGH 50: CPT

## 2020-11-14 RX ORDER — CARBAMAZEPINE 200 MG
200 TABLET ORAL
Refills: 0 | Status: DISCONTINUED | OUTPATIENT
Start: 2020-11-14 | End: 2020-11-15

## 2020-11-14 RX ADMIN — TACROLIMUS 2 MILLIGRAM(S): 5 CAPSULE ORAL at 17:51

## 2020-11-14 RX ADMIN — URSODIOL 300 MILLIGRAM(S): 250 TABLET, FILM COATED ORAL at 05:44

## 2020-11-14 RX ADMIN — HEPARIN SODIUM 5000 UNIT(S): 5000 INJECTION INTRAVENOUS; SUBCUTANEOUS at 05:44

## 2020-11-14 RX ADMIN — ATORVASTATIN CALCIUM 80 MILLIGRAM(S): 80 TABLET, FILM COATED ORAL at 21:16

## 2020-11-14 RX ADMIN — PANTOPRAZOLE SODIUM 40 MILLIGRAM(S): 20 TABLET, DELAYED RELEASE ORAL at 21:16

## 2020-11-14 RX ADMIN — HEPARIN SODIUM 5000 UNIT(S): 5000 INJECTION INTRAVENOUS; SUBCUTANEOUS at 17:50

## 2020-11-14 RX ADMIN — Medication 2000 UNIT(S): at 12:51

## 2020-11-14 RX ADMIN — Medication 81 MILLIGRAM(S): at 12:51

## 2020-11-14 RX ADMIN — URSODIOL 300 MILLIGRAM(S): 250 TABLET, FILM COATED ORAL at 17:51

## 2020-11-14 RX ADMIN — TACROLIMUS 2 MILLIGRAM(S): 5 CAPSULE ORAL at 05:44

## 2020-11-14 RX ADMIN — Medication 1 TABLET(S): at 12:51

## 2020-11-14 RX ADMIN — CHLORHEXIDINE GLUCONATE 1 APPLICATION(S): 213 SOLUTION TOPICAL at 05:44

## 2020-11-14 NOTE — PROGRESS NOTE ADULT - SUBJECTIVE AND OBJECTIVE BOX
INTERVAL HPI/OVERNIGHT EVENTS:    SUBJECTIVE: Patient seen and examined at bedside.     no cp, sob, abd pain, fever  no syncope, ha, lightheadedness, dizziness    OBJECTIVE:    VITAL SIGNS:  Vital Signs Last 24 Hrs  T(C): 36.2 (14 Nov 2020 04:55), Max: 36.3 (13 Nov 2020 21:50)  T(F): 97.2 (14 Nov 2020 04:55), Max: 97.4 (13 Nov 2020 21:50)  HR: 82 (14 Nov 2020 04:55) (66 - 82)  BP: 126/60 (14 Nov 2020 04:55) (126/60 - 128/69)  BP(mean): --  RR: 16 (14 Nov 2020 04:55) (16 - 16)  SpO2: --      PHYSICAL EXAM:    General: NAD  HEENT: NC/AT; PERRL, clear conjunctiva  Neck: supple  Respiratory: CTA b/l  Cardiovascular: +S1/S2; RRR  Abdomen: soft, NT/ND; +BS x4  Extremities: WWP, 2+ peripheral pulses b/l; no LE edema  Skin: normal color and turgor; no rash  Neurological:    MEDICATIONS:  MEDICATIONS  (STANDING):  aspirin  chewable 81 milliGRAM(s) Oral daily  atorvastatin 80 milliGRAM(s) Oral at bedtime  chlorhexidine 4% Liquid 1 Application(s) Topical <User Schedule>  cholecalciferol 2000 Unit(s) Oral daily  heparin   Injectable 5000 Unit(s) SubCutaneous every 12 hours  influenza   Vaccine 0.5 milliLiter(s) IntraMuscular once  multivitamin 1 Tablet(s) Oral daily  pantoprazole    Tablet 40 milliGRAM(s) Oral before breakfast  tacrolimus 2 milliGRAM(s) Oral two times a day  ursodiol Capsule 300 milliGRAM(s) Oral every 12 hours    MEDICATIONS  (PRN):  calcium carbonate   1250 mG (OsCal) 1 Tablet(s) Oral two times a day PRN Heartburn      ALLERGIES:  Allergies    iodine (Unknown)  shellfish (Unknown)    Intolerances        LABS:                        11.7   4.88  )-----------( 159      ( 13 Nov 2020 06:23 )             37.3     Hemoglobin: 11.7 g/dL (11-13 @ 06:23)  Hemoglobin: 10.9 g/dL (11-12 @ 06:45)  Hemoglobin: 11.4 g/dL (11-11 @ 12:15)    CBC Full  -  ( 13 Nov 2020 06:23 )  WBC Count : 4.88 K/uL  RBC Count : 3.87 M/uL  Hemoglobin : 11.7 g/dL  Hematocrit : 37.3 %  Platelet Count - Automated : 159 K/uL  Mean Cell Volume : 96.4 fL  Mean Cell Hemoglobin : 30.2 pg  Mean Cell Hemoglobin Concentration : 31.4 g/dL  Auto Neutrophil # : x  Auto Lymphocyte # : x  Auto Monocyte # : x  Auto Eosinophil # : x  Auto Basophil # : x  Auto Neutrophil % : x  Auto Lymphocyte % : x  Auto Monocyte % : x  Auto Eosinophil % : x  Auto Basophil % : x    11-13    139  |  105  |  22<H>  ----------------------------<  96  5.0   |  25  |  1.2    Ca    9.1      13 Nov 2020 06:23  Phos  4.3     11-13  Mg     2.0     11-13      Creatinine Trend: 1.2<--, 1.2<--, 1.3<--    PT/INR - ( 13 Nov 2020 06:23 )   PT: 12.40 sec;   INR: 1.08 ratio         PTT - ( 13 Nov 2020 06:23 )  PTT:32.9 sec    hs Troponin:              CSF:                      EKG:   MICROBIOLOGY:    IMAGING:      Labs, imaging, EKG personally reviewed    RADIOLOGY & ADDITIONAL TESTS: Reviewed.

## 2020-11-14 NOTE — PROGRESS NOTE ADULT - ASSESSMENT
59F PMHx CVA 2018, PBC s/p liver transplant 2006, seizure disorder 7/2020 here with L facial twitch/ droop. CTH with new focal hypoattenuation R temporal.    #L facial twitch/ droop  lasted 10 minutes, unable to form seal after drinking coffee  cth with focal hypoattenuation R temporal  mri head noted  pending repeat mri with contrast; pt with h/o ct contrast induced hives  d/w transplant team at [facility]; plan for transfer to liver floor  reeg  appreciate neuro  to start tegretol post reeg  #CVA  asa  lipitor 80  #Primary biliary cirrhosis  tacrolimus 2 bid  check level  urosdiol 300 bid  #DVT ppx  subq hep    #Progress Note Handoff:  Pending (specify):  Consults_________, Tests________, Test Results_______, Other____transfer to [place]_____  Family discussion:d/w pt at bedside re: treatment plan, primary dx  Disposition: Home_x__/SNF___/Other________/Unknown at this time________

## 2020-11-15 LAB
ANION GAP SERPL CALC-SCNC: 8 MMOL/L — SIGNIFICANT CHANGE UP (ref 7–14)
BUN SERPL-MCNC: 25 MG/DL — HIGH (ref 10–20)
CALCIUM SERPL-MCNC: 9 MG/DL — SIGNIFICANT CHANGE UP (ref 8.5–10.1)
CHLORIDE SERPL-SCNC: 106 MMOL/L — SIGNIFICANT CHANGE UP (ref 98–110)
CO2 SERPL-SCNC: 24 MMOL/L — SIGNIFICANT CHANGE UP (ref 17–32)
CREAT SERPL-MCNC: 1.3 MG/DL — SIGNIFICANT CHANGE UP (ref 0.7–1.5)
GLUCOSE SERPL-MCNC: 84 MG/DL — SIGNIFICANT CHANGE UP (ref 70–99)
HCT VFR BLD CALC: 36.2 % — LOW (ref 37–47)
HGB BLD-MCNC: 11.2 G/DL — LOW (ref 12–16)
MAGNESIUM SERPL-MCNC: 1.4 MG/DL — LOW (ref 1.8–2.4)
MCHC RBC-ENTMCNC: 29.4 PG — SIGNIFICANT CHANGE UP (ref 27–31)
MCHC RBC-ENTMCNC: 30.9 G/DL — LOW (ref 32–37)
MCV RBC AUTO: 95 FL — SIGNIFICANT CHANGE UP (ref 81–99)
NRBC # BLD: 0 /100 WBCS — SIGNIFICANT CHANGE UP (ref 0–0)
PHOSPHATE SERPL-MCNC: 4.1 MG/DL — SIGNIFICANT CHANGE UP (ref 2.1–4.9)
PLATELET # BLD AUTO: 134 K/UL — SIGNIFICANT CHANGE UP (ref 130–400)
POTASSIUM SERPL-MCNC: 4.6 MMOL/L — SIGNIFICANT CHANGE UP (ref 3.5–5)
POTASSIUM SERPL-SCNC: 4.6 MMOL/L — SIGNIFICANT CHANGE UP (ref 3.5–5)
RBC # BLD: 3.81 M/UL — LOW (ref 4.2–5.4)
RBC # FLD: 14.6 % — HIGH (ref 11.5–14.5)
SODIUM SERPL-SCNC: 138 MMOL/L — SIGNIFICANT CHANGE UP (ref 135–146)
WBC # BLD: 5.07 K/UL — SIGNIFICANT CHANGE UP (ref 4.8–10.8)
WBC # FLD AUTO: 5.07 K/UL — SIGNIFICANT CHANGE UP (ref 4.8–10.8)

## 2020-11-15 PROCEDURE — 99233 SBSQ HOSP IP/OBS HIGH 50: CPT

## 2020-11-15 PROCEDURE — 99232 SBSQ HOSP IP/OBS MODERATE 35: CPT

## 2020-11-15 RX ORDER — MAGNESIUM SULFATE 500 MG/ML
2 VIAL (ML) INJECTION
Refills: 0 | Status: COMPLETED | OUTPATIENT
Start: 2020-11-15 | End: 2020-11-15

## 2020-11-15 RX ADMIN — ATORVASTATIN CALCIUM 80 MILLIGRAM(S): 80 TABLET, FILM COATED ORAL at 21:19

## 2020-11-15 RX ADMIN — URSODIOL 300 MILLIGRAM(S): 250 TABLET, FILM COATED ORAL at 17:21

## 2020-11-15 RX ADMIN — URSODIOL 300 MILLIGRAM(S): 250 TABLET, FILM COATED ORAL at 05:32

## 2020-11-15 RX ADMIN — Medication 2000 UNIT(S): at 11:04

## 2020-11-15 RX ADMIN — Medication 50 GRAM(S): at 13:26

## 2020-11-15 RX ADMIN — TACROLIMUS 2 MILLIGRAM(S): 5 CAPSULE ORAL at 17:22

## 2020-11-15 RX ADMIN — Medication 81 MILLIGRAM(S): at 11:04

## 2020-11-15 RX ADMIN — TACROLIMUS 2 MILLIGRAM(S): 5 CAPSULE ORAL at 05:31

## 2020-11-15 RX ADMIN — HEPARIN SODIUM 5000 UNIT(S): 5000 INJECTION INTRAVENOUS; SUBCUTANEOUS at 05:30

## 2020-11-15 RX ADMIN — PANTOPRAZOLE SODIUM 40 MILLIGRAM(S): 20 TABLET, DELAYED RELEASE ORAL at 21:19

## 2020-11-15 RX ADMIN — HEPARIN SODIUM 5000 UNIT(S): 5000 INJECTION INTRAVENOUS; SUBCUTANEOUS at 17:22

## 2020-11-15 RX ADMIN — Medication 50 GRAM(S): at 14:29

## 2020-11-15 RX ADMIN — Medication 50 GRAM(S): at 12:36

## 2020-11-15 RX ADMIN — Medication 1 TABLET(S): at 11:03

## 2020-11-15 RX ADMIN — CHLORHEXIDINE GLUCONATE 1 APPLICATION(S): 213 SOLUTION TOPICAL at 06:07

## 2020-11-15 NOTE — PROGRESS NOTE ADULT - SUBJECTIVE AND OBJECTIVE BOX
INTERVAL HPI/OVERNIGHT EVENTS:    SUBJECTIVE: Patient seen and examined at bedside.     no cp, sob, abd pain, fever  no ha, syncope, lightheadedness, dizziness  OBJECTIVE:    VITAL SIGNS:  Vital Signs Last 24 Hrs  T(C): 36.4 (15 Nov 2020 05:29), Max: 36.4 (15 Nov 2020 05:29)  T(F): 97.5 (15 Nov 2020 05:29), Max: 97.5 (15 Nov 2020 05:29)  HR: 69 (15 Nov 2020 05:29) (69 - 79)  BP: 123/60 (15 Nov 2020 05:29) (123/60 - 138/63)  BP(mean): --  RR: 16 (15 Nov 2020 05:29) (16 - 16)  SpO2: --      PHYSICAL EXAM:    General: NAD  HEENT: NC/AT; PERRL, clear conjunctiva  Neck: supple  Respiratory: CTA b/l  Cardiovascular: +S1/S2; RRR  Abdomen: soft, NT/ND; +BS x4  Extremities: WWP, 2+ peripheral pulses b/l; no LE edema  Skin: normal color and turgor; no rash  Neurological:    MEDICATIONS:  MEDICATIONS  (STANDING):  aspirin  chewable 81 milliGRAM(s) Oral daily  atorvastatin 80 milliGRAM(s) Oral at bedtime  chlorhexidine 4% Liquid 1 Application(s) Topical <User Schedule>  cholecalciferol 2000 Unit(s) Oral daily  heparin   Injectable 5000 Unit(s) SubCutaneous every 12 hours  influenza   Vaccine 0.5 milliLiter(s) IntraMuscular once  magnesium sulfate  IVPB 2 Gram(s) IV Intermittent every 2 hours  multivitamin 1 Tablet(s) Oral daily  pantoprazole    Tablet 40 milliGRAM(s) Oral before breakfast  tacrolimus 2 milliGRAM(s) Oral two times a day  ursodiol Capsule 300 milliGRAM(s) Oral every 12 hours    MEDICATIONS  (PRN):  calcium carbonate   1250 mG (OsCal) 1 Tablet(s) Oral two times a day PRN Heartburn      ALLERGIES:  Allergies    iodine (Unknown)  shellfish (Unknown)    Intolerances        LABS:                        11.2   5.07  )-----------( 134      ( 15 Nov 2020 06:26 )             36.2     Hemoglobin: 11.2 g/dL (11-15 @ 06:26)  Hemoglobin: 11.7 g/dL (11-13 @ 06:23)  Hemoglobin: 10.9 g/dL (11-12 @ 06:45)  Hemoglobin: 11.4 g/dL (11-11 @ 12:15)    CBC Full  -  ( 15 Nov 2020 06:26 )  WBC Count : 5.07 K/uL  RBC Count : 3.81 M/uL  Hemoglobin : 11.2 g/dL  Hematocrit : 36.2 %  Platelet Count - Automated : 134 K/uL  Mean Cell Volume : 95.0 fL  Mean Cell Hemoglobin : 29.4 pg  Mean Cell Hemoglobin Concentration : 30.9 g/dL  Auto Neutrophil # : x  Auto Lymphocyte # : x  Auto Monocyte # : x  Auto Eosinophil # : x  Auto Basophil # : x  Auto Neutrophil % : x  Auto Lymphocyte % : x  Auto Monocyte % : x  Auto Eosinophil % : x  Auto Basophil % : x    11-15    138  |  106  |  25<H>  ----------------------------<  84  4.6   |  24  |  1.3    Ca    9.0      15 Nov 2020 06:26  Phos  4.1     11-15  Mg     1.4     11-15      Creatinine Trend: 1.3<--, 1.2<--, 1.2<--, 1.3<--        hs Troponin:              CSF:                      EKG:   MICROBIOLOGY:    IMAGING:      Labs, imaging, EKG personally reviewed    RADIOLOGY & ADDITIONAL TESTS: Reviewed.

## 2020-11-15 NOTE — PROGRESS NOTE ADULT - ASSESSMENT
59F PMHx CVA 2018, PBC s/p liver transplant 2006, seizure disorder 7/2020 here with L facial twitch/ droop. CTH with new focal hypoattenuation R temporal.    #L facial twitch/ droop  lasted 10 minutes, unable to form seal after drinking coffee  cth with focal hypoattenuation R temporal  mri head noted  pending repeat mri with contrast; pt with h/o ct contrast induced hives  d/w transplant team at [facility]; plan for transfer to liver floor  reeg neg, pt declining to start tegretol at this time  appreciate neuro  #CVA  asa  lipitor 80  #Primary biliary cirrhosis  tacrolimus 2 bid  check level  urosdiol 300 bid  #DVT ppx  subq hep    #Progress Note Handoff:  Pending (specify):  Consults_________, Tests________, Test Results_______, Other____transfer to [place]_____  Family discussion:d/w pt at bedside re: treatment plan, primary dx  Disposition: Home_x__/SNF___/Other________/Unknown at this time________

## 2020-11-16 LAB
ANION GAP SERPL CALC-SCNC: 8 MMOL/L — SIGNIFICANT CHANGE UP (ref 7–14)
BUN SERPL-MCNC: 29 MG/DL — HIGH (ref 10–20)
CALCIUM SERPL-MCNC: 9.2 MG/DL — SIGNIFICANT CHANGE UP (ref 8.5–10.1)
CHLORIDE SERPL-SCNC: 105 MMOL/L — SIGNIFICANT CHANGE UP (ref 98–110)
CO2 SERPL-SCNC: 25 MMOL/L — SIGNIFICANT CHANGE UP (ref 17–32)
CREAT SERPL-MCNC: 1.3 MG/DL — SIGNIFICANT CHANGE UP (ref 0.7–1.5)
GLUCOSE SERPL-MCNC: 96 MG/DL — SIGNIFICANT CHANGE UP (ref 70–99)
MAGNESIUM SERPL-MCNC: 2.2 MG/DL — SIGNIFICANT CHANGE UP (ref 1.8–2.4)
PHOSPHATE SERPL-MCNC: 4.2 MG/DL — SIGNIFICANT CHANGE UP (ref 2.1–4.9)
POTASSIUM SERPL-MCNC: 5 MMOL/L — SIGNIFICANT CHANGE UP (ref 3.5–5)
POTASSIUM SERPL-SCNC: 5 MMOL/L — SIGNIFICANT CHANGE UP (ref 3.5–5)
SODIUM SERPL-SCNC: 138 MMOL/L — SIGNIFICANT CHANGE UP (ref 135–146)

## 2020-11-16 PROCEDURE — 99233 SBSQ HOSP IP/OBS HIGH 50: CPT

## 2020-11-16 RX ADMIN — TACROLIMUS 2 MILLIGRAM(S): 5 CAPSULE ORAL at 18:25

## 2020-11-16 RX ADMIN — Medication 2000 UNIT(S): at 11:33

## 2020-11-16 RX ADMIN — HEPARIN SODIUM 5000 UNIT(S): 5000 INJECTION INTRAVENOUS; SUBCUTANEOUS at 18:25

## 2020-11-16 RX ADMIN — HEPARIN SODIUM 5000 UNIT(S): 5000 INJECTION INTRAVENOUS; SUBCUTANEOUS at 05:21

## 2020-11-16 RX ADMIN — TACROLIMUS 2 MILLIGRAM(S): 5 CAPSULE ORAL at 05:22

## 2020-11-16 RX ADMIN — Medication 81 MILLIGRAM(S): at 11:33

## 2020-11-16 RX ADMIN — Medication 1 TABLET(S): at 11:33

## 2020-11-16 RX ADMIN — PANTOPRAZOLE SODIUM 40 MILLIGRAM(S): 20 TABLET, DELAYED RELEASE ORAL at 21:26

## 2020-11-16 RX ADMIN — URSODIOL 300 MILLIGRAM(S): 250 TABLET, FILM COATED ORAL at 18:26

## 2020-11-16 RX ADMIN — URSODIOL 300 MILLIGRAM(S): 250 TABLET, FILM COATED ORAL at 05:21

## 2020-11-16 NOTE — PROGRESS NOTE ADULT - SUBJECTIVE AND OBJECTIVE BOX
Neurology Progress Note    Interval History:      Patient seen in f/u for seizure and AMS.  SHe has right temporal abnormality on MRI.  She is s/p liver transplant at [facility].  She reported side effects to keppra and doesn't wish to continue it.    Vital Signs Last 24 Hrs  T(C): 35.8 (16 Nov 2020 05:58), Max: 36.2 (15 Nov 2020 13:50)  T(F): 96.5 (16 Nov 2020 05:58), Max: 97.2 (15 Nov 2020 13:50)  HR: 66 (16 Nov 2020 05:58) (66 - 77)  BP: 115/56 (16 Nov 2020 05:58) (115/56 - 129/63)  BP(mean): --  RR: 16 (16 Nov 2020 05:58) (16 - 16)  SpO2: --    Neurological Exam:   Mental status: Awake, alert and oriented x3.  She gets all 3 correct on short term recall.  Naming, repetition and comprehension intact.  Attention/concentration intact.  No dysarthria, no aphasia.  Fund of knowledge appropriate.    Cranial nerves: Pupils equally round and reactive to light, visual fields full, no nystagmus, extraocular muscles intact, V1 through V3 intact bilaterally and symmetric, face symmetric, hearing intact to finger rub, palate elevation symmetric, tongue was midline.  Motor:  MRC grading 5/5 b/l UE/LE.   strength 5/5.  Normal tone and bulk.  No abnormal movements.    Sensation: Intact to light touch, proprioception, and pinprick.   Coordination: No dysmetria on finger-to-nose and heel-to-shin.  No dysdiadokinesia.    MEDICATIONS  (STANDING):  aspirin  chewable 81 milliGRAM(s) Oral daily  atorvastatin 80 milliGRAM(s) Oral at bedtime  chlorhexidine 4% Liquid 1 Application(s) Topical <User Schedule>  cholecalciferol 2000 Unit(s) Oral daily  heparin   Injectable 5000 Unit(s) SubCutaneous every 12 hours  influenza   Vaccine 0.5 milliLiter(s) IntraMuscular once  multivitamin 1 Tablet(s) Oral daily  pantoprazole    Tablet 40 milliGRAM(s) Oral before breakfast  tacrolimus 2 milliGRAM(s) Oral two times a day  ursodiol Capsule 300 milliGRAM(s) Oral every 12 hours        Labs:  CBC Full  -  ( 15 Nov 2020 06:26 )  WBC Count : 5.07 K/uL  RBC Count : 3.81 M/uL  Hemoglobin : 11.2 g/dL  Hematocrit : 36.2 %  Platelet Count - Automated : 134 K/uL  Mean Cell Volume : 95.0 fL  Mean Cell Hemoglobin : 29.4 pg  Mean Cell Hemoglobin Concentration : 30.9 g/dL  Auto Neutrophil # : x  Auto Lymphocyte # : x  Auto Monocyte # : x  Auto Eosinophil # : x  Auto Basophil # : x  Auto Neutrophil % : x  Auto Lymphocyte % : x  Auto Monocyte % : x  Auto Eosinophil % : x  Auto Basophil % : x    11-16    138  |  105  |  29<H>  ----------------------------<  96  5.0   |  25  |  1.3    Ca    9.2      16 Nov 2020 06:43  Phos  4.2     11-16  Mg     2.2     11-16        < from: MR Head No Cont (11.12.20 @ 17:00) >  New abnormal white mattersignal abnormality involving the right lateral temporal lobe as well as punctate focus of signal abnormality involving the left middle frontal gyrus in the cortical region. Findings are nonspecific and may represent infectious/inflammatory etiology though neoplasm cannot be entirely excluded. Postcontrast examination is recommended for further evaluation.    No evidence of acute infarct, intracranial hemorrhage or midline shift.    These findings were discussed with Dr. Carlisle at 11/13/2020 8:34 AMby Dr. Morales with read back confirmation.    < end of copied text >      < from: EEG (11.13.20 @ 11:00) >  Impression  Normal    Clinical Correlation &Recommendations  A normal EEG does not exclude the possibility of seizure disorder. Clinical correlation is recommended.    < end of copied text >      Assessment:  This is a 59y Female w/ h/o liver transplant on tacrilimus with seizure but intolerance to keppra.  Large area of right anterior temporal hyperintensity   on noncontrast MRI (? lymphoma, ? infection).  She is high risk for recurrent seizure given location of temporal lesion and absence of anticonvulsants.    Plan:  1.  Seizure precautions.  If seizure recurrs consider treatment with lacosamide.  2.  F/u imaging though patient currently declining contrast.  She will likely need to discuss her concerns with transplant team at [place].  Follow up imaging will be required to assess  stability of lesion.  Please make sure patient gets CD of brain imaging we performed to take with her to [place].  3.  CSF studies including cytology, encephalitis panel and viral PCR .   Neurology Progress Note    Interval History:      Patient seen in f/u for seizure and AMS.  SHe has right temporal abnormality on MRI.  She is s/p liver transplant at [facility].  She reported side effects to keppra and doesn't wish to continue it.    Vital Signs Last 24 Hrs  T(C): 35.8 (16 Nov 2020 05:58), Max: 36.2 (15 Nov 2020 13:50)  T(F): 96.5 (16 Nov 2020 05:58), Max: 97.2 (15 Nov 2020 13:50)  HR: 66 (16 Nov 2020 05:58) (66 - 77)  BP: 115/56 (16 Nov 2020 05:58) (115/56 - 129/63)  BP(mean): --  RR: 16 (16 Nov 2020 05:58) (16 - 16)  SpO2: --    Neurological Exam:   Mental status: Awake, alert and oriented x3.  She gets all 3 correct on short term recall.  Naming, repetition and comprehension intact.  Attention/concentration intact.  No dysarthria, no aphasia.  Fund of knowledge appropriate.    Cranial nerves: Pupils equally round and reactive to light, visual fields full, no nystagmus, extraocular muscles intact, V1 through V3 intact bilaterally and symmetric, face symmetric, hearing intact to finger rub, palate elevation symmetric, tongue was midline.  Motor:  MRC grading 5/5 b/l UE/LE.   strength 5/5.  Normal tone and bulk.  No abnormal movements.    Sensation: Intact to light touch, proprioception, and pinprick.   Coordination: No dysmetria on finger-to-nose and heel-to-shin.  No dysdiadokinesia.    MEDICATIONS  (STANDING):  aspirin  chewable 81 milliGRAM(s) Oral daily  atorvastatin 80 milliGRAM(s) Oral at bedtime  chlorhexidine 4% Liquid 1 Application(s) Topical <User Schedule>  cholecalciferol 2000 Unit(s) Oral daily  heparin   Injectable 5000 Unit(s) SubCutaneous every 12 hours  influenza   Vaccine 0.5 milliLiter(s) IntraMuscular once  multivitamin 1 Tablet(s) Oral daily  pantoprazole    Tablet 40 milliGRAM(s) Oral before breakfast  tacrolimus 2 milliGRAM(s) Oral two times a day  ursodiol Capsule 300 milliGRAM(s) Oral every 12 hours        Labs:  CBC Full  -  ( 15 Nov 2020 06:26 )  WBC Count : 5.07 K/uL  RBC Count : 3.81 M/uL  Hemoglobin : 11.2 g/dL  Hematocrit : 36.2 %  Platelet Count - Automated : 134 K/uL  Mean Cell Volume : 95.0 fL  Mean Cell Hemoglobin : 29.4 pg  Mean Cell Hemoglobin Concentration : 30.9 g/dL  Auto Neutrophil # : x  Auto Lymphocyte # : x  Auto Monocyte # : x  Auto Eosinophil # : x  Auto Basophil # : x  Auto Neutrophil % : x  Auto Lymphocyte % : x  Auto Monocyte % : x  Auto Eosinophil % : x  Auto Basophil % : x    11-16    138  |  105  |  29<H>  ----------------------------<  96  5.0   |  25  |  1.3    Ca    9.2      16 Nov 2020 06:43  Phos  4.2     11-16  Mg     2.2     11-16        < from: MR Head No Cont (11.12.20 @ 17:00) >  New abnormal white mattersignal abnormality involving the right lateral temporal lobe as well as punctate focus of signal abnormality involving the left middle frontal gyrus in the cortical region. Findings are nonspecific and may represent infectious/inflammatory etiology though neoplasm cannot be entirely excluded. Postcontrast examination is recommended for further evaluation.    No evidence of acute infarct, intracranial hemorrhage or midline shift.    These findings were discussed with Dr. Carlisle at 11/13/2020 8:34 AMby Dr. Morales with read back confirmation.    < end of copied text >      < from: EEG (11.13.20 @ 11:00) >  Impression  Normal    Clinical Correlation &Recommendations  A normal EEG does not exclude the possibility of seizure disorder. Clinical correlation is recommended.    < end of copied text >      Assessment:  This is a 59y Female w/ h/o liver transplant on tacrilimus with seizure but intolerance to keppra.  Large area of right anterior temporal hyperintensity   on noncontrast MRI (? lymphoma, ? infection, ? PML).  She is high risk for recurrent seizure given location of temporal lesion and absence of anticonvulsants.    Plan:  1.  Seizure precautions.  If seizure recurrs consider treatment with lacosamide.  2.  F/u imaging though patient currently declining contrast.  She will likely need to discuss her concerns with transplant team at [place].  Follow up imaging will be required to assess  stability of lesion.  Please make sure patient gets CD of brain imaging we performed to take with her to [place].  3.  CSF studies including cytology, encephalitis panel and viral PCR, MINDY virus.

## 2020-11-16 NOTE — PROGRESS NOTE ADULT - ASSESSMENT
59F PMHx CVA 2018, PBC s/p liver transplant 2006, seizure disorder 7/2020 here with L facial twitch/ droop. CTH with new focal hypoattenuation R temporal.    #L facial twitch/ droop  lasted 10 minutes, unable to form seal after drinking coffee  cth with focal hypoattenuation R temporal  mri head noted  pending repeat mri with contrast; pt with h/o ct contrast induced hives  d/w transplant team at [facility]; plan for transfer to liver floor  reeg neg, pt declining to start tegretol at this time  appreciate neuro  #CVA  asa  lipitor 80  #Primary biliary cirrhosis  tacrolimus 2 bid  check level  urosdiol 300 bid  #DVT ppx  subq hep    #Progress Note Handoff:  Pending (specify):  Consults_________, Tests________, Test Results_______, Other____transfer to [place]_____  Family discussion:d/w pt at bedside re: treatment plan, primary dx  Disposition: Home_x__/SNF___/Other________/Unknown at this time________

## 2020-11-16 NOTE — PROGRESS NOTE ADULT - SUBJECTIVE AND OBJECTIVE BOX
INTERVAL HPI/OVERNIGHT EVENTS:    SUBJECTIVE: Patient seen and examined at bedside.     no cp, sob, abd pain, fever  no syncope, lightheadedness, dizziness, ha  OBJECTIVE:    VITAL SIGNS:  Vital Signs Last 24 Hrs  T(C): 35.8 (16 Nov 2020 05:58), Max: 36.2 (15 Nov 2020 13:50)  T(F): 96.5 (16 Nov 2020 05:58), Max: 97.2 (15 Nov 2020 13:50)  HR: 66 (16 Nov 2020 05:58) (66 - 77)  BP: 115/56 (16 Nov 2020 05:58) (115/56 - 129/63)  BP(mean): --  RR: 16 (16 Nov 2020 05:58) (16 - 16)  SpO2: --      PHYSICAL EXAM:    General: NAD  HEENT: NC/AT; PERRL, clear conjunctiva  Neck: supple  Respiratory: CTA b/l  Cardiovascular: +S1/S2; RRR  Abdomen: soft, NT/ND; +BS x4  Extremities: WWP, 2+ peripheral pulses b/l; no LE edema  Skin: normal color and turgor; no rash  Neurological:    MEDICATIONS:  MEDICATIONS  (STANDING):  aspirin  chewable 81 milliGRAM(s) Oral daily  atorvastatin 80 milliGRAM(s) Oral at bedtime  chlorhexidine 4% Liquid 1 Application(s) Topical <User Schedule>  cholecalciferol 2000 Unit(s) Oral daily  heparin   Injectable 5000 Unit(s) SubCutaneous every 12 hours  influenza   Vaccine 0.5 milliLiter(s) IntraMuscular once  multivitamin 1 Tablet(s) Oral daily  pantoprazole    Tablet 40 milliGRAM(s) Oral before breakfast  tacrolimus 2 milliGRAM(s) Oral two times a day  ursodiol Capsule 300 milliGRAM(s) Oral every 12 hours    MEDICATIONS  (PRN):  calcium carbonate   1250 mG (OsCal) 1 Tablet(s) Oral two times a day PRN Heartburn      ALLERGIES:  Allergies    iodine (Unknown)  shellfish (Unknown)    Intolerances        LABS:                        11.2   5.07  )-----------( 134      ( 15 Nov 2020 06:26 )             36.2     Hemoglobin: 11.2 g/dL (11-15 @ 06:26)  Hemoglobin: 11.7 g/dL (11-13 @ 06:23)  Hemoglobin: 10.9 g/dL (11-12 @ 06:45)  Hemoglobin: 11.4 g/dL (11-11 @ 12:15)    CBC Full  -  ( 15 Nov 2020 06:26 )  WBC Count : 5.07 K/uL  RBC Count : 3.81 M/uL  Hemoglobin : 11.2 g/dL  Hematocrit : 36.2 %  Platelet Count - Automated : 134 K/uL  Mean Cell Volume : 95.0 fL  Mean Cell Hemoglobin : 29.4 pg  Mean Cell Hemoglobin Concentration : 30.9 g/dL  Auto Neutrophil # : x  Auto Lymphocyte # : x  Auto Monocyte # : x  Auto Eosinophil # : x  Auto Basophil # : x  Auto Neutrophil % : x  Auto Lymphocyte % : x  Auto Monocyte % : x  Auto Eosinophil % : x  Auto Basophil % : x    11-16    138  |  105  |  29<H>  ----------------------------<  96  5.0   |  25  |  1.3    Ca    9.2      16 Nov 2020 06:43  Phos  4.2     11-16  Mg     2.2     11-16      Creatinine Trend: 1.3<--, 1.3<--, 1.2<--, 1.2<--, 1.3<--        hs Troponin:              CSF:                      EKG:   MICROBIOLOGY:    IMAGING:      Labs, imaging, EKG personally reviewed    RADIOLOGY & ADDITIONAL TESTS: Reviewed.

## 2020-11-17 VITALS
SYSTOLIC BLOOD PRESSURE: 191 MMHG | DIASTOLIC BLOOD PRESSURE: 81 MMHG | HEART RATE: 70 BPM | TEMPERATURE: 98 F | RESPIRATION RATE: 16 BRPM

## 2020-11-17 PROCEDURE — 99239 HOSP IP/OBS DSCHRG MGMT >30: CPT

## 2020-11-17 RX ADMIN — TACROLIMUS 2 MILLIGRAM(S): 5 CAPSULE ORAL at 06:41

## 2020-11-17 RX ADMIN — Medication 2000 UNIT(S): at 11:01

## 2020-11-17 RX ADMIN — Medication 1 TABLET(S): at 11:01

## 2020-11-17 RX ADMIN — Medication 81 MILLIGRAM(S): at 11:01

## 2020-11-17 RX ADMIN — URSODIOL 300 MILLIGRAM(S): 250 TABLET, FILM COATED ORAL at 06:41

## 2020-11-17 RX ADMIN — CHLORHEXIDINE GLUCONATE 1 APPLICATION(S): 213 SOLUTION TOPICAL at 06:41

## 2020-11-17 NOTE — PROGRESS NOTE ADULT - ASSESSMENT
59F PMHx CVA 2018, PBC s/p liver transplant 2006, seizure disorder 7/2020 here with L facial twitch/ droop. CTH with new focal hypoattenuation R temporal.    #L facial twitch/ droop  lasted 10 minutes, unable to form seal after drinking coffee  cth with focal hypoattenuation R temporal  mri head noted  pt currently refusing mri with contrast; pt with h/o ct contrast induced hives  transfer to [facility] prolonged, due to financial/ auth  d/w neuro, pt; given assymptomatic, plan to d/c pt home with f/u with liver team at [facility]  will need outpt pmd, neuro, hepatology f/u one week  reeg neg, pt declining to start tegretol at this time  #CVA  asa  lipitor 80  #Primary biliary cirrhosis  tacrolimus 2 bid  check level  urosdiol 300 bid  #DVT ppx  subq hep

## 2020-11-17 NOTE — PROGRESS NOTE ADULT - REASON FOR ADMISSION
Seizure like activity

## 2020-11-17 NOTE — PROGRESS NOTE ADULT - SUBJECTIVE AND OBJECTIVE BOX
INTERVAL HPI/OVERNIGHT EVENTS:    SUBJECTIVE: Patient seen and examined at bedside.     no cp, sob, abd pain, fever  no ha, syncope, lightheadedness ,dizziness    OBJECTIVE:    VITAL SIGNS:  Vital Signs Last 24 Hrs  T(C): 35.3 (17 Nov 2020 06:05), Max: 36.3 (16 Nov 2020 21:07)  T(F): 95.5 (17 Nov 2020 06:05), Max: 97.3 (16 Nov 2020 21:07)  HR: 70 (17 Nov 2020 06:05) (70 - 79)  BP: 118/57 (17 Nov 2020 06:05) (115/60 - 131/60)  BP(mean): --  RR: 16 (17 Nov 2020 06:05) (16 - 16)  SpO2: --      PHYSICAL EXAM:    General: NAD  HEENT: NC/AT; PERRL, clear conjunctiva  Neck: supple  Respiratory: CTA b/l  Cardiovascular: +S1/S2; RRR  Abdomen: soft, NT/ND; +BS x4  Extremities: WWP, 2+ peripheral pulses b/l; no LE edema  Skin: normal color and turgor; no rash  Neurological:    MEDICATIONS:  MEDICATIONS  (STANDING):  aspirin  chewable 81 milliGRAM(s) Oral daily  atorvastatin 80 milliGRAM(s) Oral at bedtime  chlorhexidine 4% Liquid 1 Application(s) Topical <User Schedule>  cholecalciferol 2000 Unit(s) Oral daily  heparin   Injectable 5000 Unit(s) SubCutaneous every 12 hours  influenza   Vaccine 0.5 milliLiter(s) IntraMuscular once  multivitamin 1 Tablet(s) Oral daily  pantoprazole    Tablet 40 milliGRAM(s) Oral before breakfast  tacrolimus 2 milliGRAM(s) Oral two times a day  ursodiol Capsule 300 milliGRAM(s) Oral every 12 hours    MEDICATIONS  (PRN):  calcium carbonate   1250 mG (OsCal) 1 Tablet(s) Oral two times a day PRN Heartburn      ALLERGIES:  Allergies    iodine (Unknown)  shellfish (Unknown)    Intolerances        LABS:    Hemoglobin: 11.2 g/dL (11-15 @ 06:26)  Hemoglobin: 11.7 g/dL (11-13 @ 06:23)      11-16    138  |  105  |  29<H>  ----------------------------<  96  5.0   |  25  |  1.3    Ca    9.2      16 Nov 2020 06:43  Phos  4.2     11-16  Mg     2.2     11-16      Creatinine Trend: 1.3<--, 1.3<--, 1.2<--, 1.2<--, 1.3<--        hs Troponin:              CSF:                      EKG:   MICROBIOLOGY:    IMAGING:      Labs, imaging, EKG personally reviewed    RADIOLOGY & ADDITIONAL TESTS: Reviewed.

## 2020-11-20 DIAGNOSIS — Z91.041 RADIOGRAPHIC DYE ALLERGY STATUS: ICD-10-CM

## 2020-11-20 DIAGNOSIS — R25.3 FASCICULATION: ICD-10-CM

## 2020-11-20 DIAGNOSIS — Z79.82 LONG TERM (CURRENT) USE OF ASPIRIN: ICD-10-CM

## 2020-11-20 DIAGNOSIS — K74.3 PRIMARY BILIARY CIRRHOSIS: ICD-10-CM

## 2020-11-20 DIAGNOSIS — G93.89 OTHER SPECIFIED DISORDERS OF BRAIN: ICD-10-CM

## 2020-11-20 DIAGNOSIS — Z94.4 LIVER TRANSPLANT STATUS: ICD-10-CM

## 2020-11-20 DIAGNOSIS — Z86.73 PERSONAL HISTORY OF TRANSIENT ISCHEMIC ATTACK (TIA), AND CEREBRAL INFARCTION WITHOUT RESIDUAL DEFICITS: ICD-10-CM

## 2020-11-20 DIAGNOSIS — R29.810 FACIAL WEAKNESS: ICD-10-CM

## 2020-11-20 DIAGNOSIS — Z91.19 PATIENT'S NONCOMPLIANCE WITH OTHER MEDICAL TREATMENT AND REGIMEN: ICD-10-CM

## 2020-11-20 DIAGNOSIS — G40.209 LOCALIZATION-RELATED (FOCAL) (PARTIAL) SYMPTOMATIC EPILEPSY AND EPILEPTIC SYNDROMES WITH COMPLEX PARTIAL SEIZURES, NOT INTRACTABLE, WITHOUT STATUS EPILEPTICUS: ICD-10-CM

## 2020-11-20 DIAGNOSIS — E55.9 VITAMIN D DEFICIENCY, UNSPECIFIED: ICD-10-CM

## 2020-11-20 DIAGNOSIS — K21.9 GASTRO-ESOPHAGEAL REFLUX DISEASE WITHOUT ESOPHAGITIS: ICD-10-CM

## 2020-11-20 DIAGNOSIS — Z87.891 PERSONAL HISTORY OF NICOTINE DEPENDENCE: ICD-10-CM

## 2020-11-20 DIAGNOSIS — Z91.013 ALLERGY TO SEAFOOD: ICD-10-CM

## 2021-05-12 NOTE — ED PROVIDER NOTE - DATE/TIME 1
Detail Level: Detailed Quality 110: Preventive Care And Screening: Influenza Immunization: Influenza Immunization previously received during influenza season 05-Mar-2020 09:50

## 2022-04-20 NOTE — ED ADULT NURSE NOTE - CAS EDP DISCH DISPOSITION ADMI

## 2022-07-29 NOTE — ED PROVIDER NOTE - CROS ED NEURO ALL NEG
----- Message from Wolfgang Leiva MD sent at 7/26/2022  6:25 PM CDT -----  Please inform patient and mother that there was no significant constipation seen on the abdominal x-ray.   negative...

## 2023-02-06 NOTE — PATIENT PROFILE ADULT - LIVING ENVIRONMENT
Lotus called to schedule a  appt today. Thought she had tampon stuck and seeing something fleshy. Doesn't know if it's prolapse bladder or uterus. .     Please be advised that the best time to call her to accommodate their needs is Anytime. Thank you. no

## 2023-10-30 NOTE — EEG REPORT - FINDINGS
She had SEs w/ both Celebrex and Wellbutrin. Tramadol was causing rebound HAs, helped w/ pain control. NSAIDs cause pain under L breast ? Ulcerous process. She took Advil often for migraine HAs. She has upset stomach w/ Topamax.     I can refill Maxalt 20 pills vs 9 pills. Is she comfortable w/ increasing Propranolol 2 tabs po bid? Maria Luisa Rich PA-C     Abnormal

## 2024-12-11 NOTE — PATIENT PROFILE ADULT - ANY SIGNIFICANT CHANGE IN ABILITY TO PERFORM THE FOLLOWING ADL SINCE THE ONSET OF PRESENT ILLNESS?
Med Requested:             Disp Refills Start End     divalproex (DEPAKOTE ER) 500 MG 24 hr ER tablet 30 tablet 1 10/10/2024 --    Sig - Route: Take 2 tablets by mouth nightly. - Oral    Sent to pharmacy as: Divalproex Sodium  MG Oral Tablet Extended Release 24 Hour (DEPAKOTE ER)          Last visit: 09/20/2024  Recommended Follow Up: 2 weeks  No Show/Cancel: 10/01/2024, 11/25/2024  Next visit: Visit date not found     No protocol  Routing to clinician for approval   
no